# Patient Record
Sex: FEMALE | Race: WHITE | NOT HISPANIC OR LATINO | Employment: UNEMPLOYED | URBAN - METROPOLITAN AREA
[De-identification: names, ages, dates, MRNs, and addresses within clinical notes are randomized per-mention and may not be internally consistent; named-entity substitution may affect disease eponyms.]

---

## 2022-02-08 ENCOUNTER — HISTORICAL (OUTPATIENT)
Dept: ADMINISTRATIVE | Facility: HOSPITAL | Age: 32
End: 2022-02-08

## 2022-08-29 ENCOUNTER — HOSPITAL ENCOUNTER (EMERGENCY)
Facility: HOSPITAL | Age: 32
Discharge: HOME | End: 2022-08-29
Attending: EMERGENCY MEDICINE
Payer: MEDICAID

## 2022-08-29 VITALS
DIASTOLIC BLOOD PRESSURE: 74 MMHG | TEMPERATURE: 98 F | SYSTOLIC BLOOD PRESSURE: 110 MMHG | HEIGHT: 64 IN | OXYGEN SATURATION: 98 % | RESPIRATION RATE: 20 BRPM | HEART RATE: 97 BPM | WEIGHT: 179 LBS | BODY MASS INDEX: 30.56 KG/M2

## 2022-08-29 DIAGNOSIS — H66.001 ACUTE SUPPURATIVE OTITIS MEDIA OF RIGHT EAR WITHOUT SPONTANEOUS RUPTURE OF TYMPANIC MEMBRANE, RECURRENCE NOT SPECIFIED: Primary | ICD-10-CM

## 2022-08-29 DIAGNOSIS — Z20.822 CLOSE EXPOSURE TO COVID-19 VIRUS: ICD-10-CM

## 2022-08-29 DIAGNOSIS — R05.9 COUGH: ICD-10-CM

## 2022-08-29 LAB
FLUAV AG UPPER RESP QL IA.RAPID: NOT DETECTED
FLUBV AG UPPER RESP QL IA.RAPID: NOT DETECTED
SARS-COV-2 RNA RESP QL NAA+PROBE: NOT DETECTED
STREP A PCR (OHS): NOT DETECTED

## 2022-08-29 PROCEDURE — 99284 EMERGENCY DEPT VISIT MOD MDM: CPT | Mod: 25

## 2022-08-29 PROCEDURE — 87636 SARSCOV2 & INF A&B AMP PRB: CPT | Mod: 59 | Performed by: PHYSICIAN ASSISTANT

## 2022-08-29 PROCEDURE — 87651 STREP A DNA AMP PROBE: CPT | Performed by: PHYSICIAN ASSISTANT

## 2022-08-29 PROCEDURE — 87631 RESP VIRUS 3-5 TARGETS: CPT | Performed by: PHYSICIAN ASSISTANT

## 2022-08-29 RX ORDER — CETIRIZINE HYDROCHLORIDE 10 MG/1
10 TABLET ORAL DAILY
Qty: 30 TABLET | Refills: 0 | Status: SHIPPED | OUTPATIENT
Start: 2022-08-29 | End: 2022-09-12

## 2022-08-29 RX ORDER — AMOXICILLIN AND CLAVULANATE POTASSIUM 875; 125 MG/1; MG/1
1 TABLET, FILM COATED ORAL EVERY 12 HOURS
Qty: 20 TABLET | Refills: 0 | Status: SHIPPED | OUTPATIENT
Start: 2022-08-29 | End: 2022-09-08

## 2022-08-29 RX ORDER — BENZONATATE 100 MG/1
100 CAPSULE ORAL 3 TIMES DAILY PRN
Qty: 30 CAPSULE | Refills: 0 | Status: SHIPPED | OUTPATIENT
Start: 2022-08-29 | End: 2022-09-08

## 2022-08-29 RX ORDER — FLUTICASONE PROPIONATE 50 MCG
1 SPRAY, SUSPENSION (ML) NASAL 2 TIMES DAILY PRN
Qty: 15 G | Refills: 0 | Status: SHIPPED | OUTPATIENT
Start: 2022-08-29 | End: 2022-09-12

## 2022-08-29 RX ORDER — IBUPROFEN 600 MG/1
600 TABLET ORAL
Status: DISCONTINUED | OUTPATIENT
Start: 2022-08-29 | End: 2022-08-29 | Stop reason: HOSPADM

## 2022-08-29 RX ORDER — ONDANSETRON 4 MG/1
4 TABLET, FILM COATED ORAL EVERY 8 HOURS PRN
Qty: 15 TABLET | Refills: 0 | Status: SHIPPED | OUTPATIENT
Start: 2022-08-29 | End: 2022-09-03

## 2022-08-29 RX ORDER — ALBUTEROL SULFATE 90 UG/1
1-2 AEROSOL, METERED RESPIRATORY (INHALATION) EVERY 6 HOURS PRN
Qty: 8 G | Refills: 0 | Status: SHIPPED | OUTPATIENT
Start: 2022-08-29 | End: 2022-09-28

## 2022-08-29 NOTE — ED PROVIDER NOTES
Encounter Date: 8/29/2022       History     Chief Complaint   Patient presents with    Fever     Pt. C/o fever, body aches, throat pain, and cough since yesterday.      32 year old female presents to ED for fever, body aches, cough, congestion, nausea,  body aches,and sore throat since last night.  Patient currently in ED with  children with similar symptoms.    The history is provided by the patient.   Review of patient's allergies indicates:   Allergen Reactions    Fluoxetine Swelling    Iodine Swelling    Dicyclomine      Other reaction(s): facial swelling w/resp. distress  Other reaction(s): facial swelling w/resp. distress       No past medical history on file.  No past surgical history on file.  No family history on file.     Review of Systems   Constitutional:  Positive for fever.   HENT:  Positive for congestion and sore throat.    Eyes: Negative.    Respiratory:  Positive for cough. Negative for shortness of breath.    Cardiovascular:  Negative for chest pain.   Gastrointestinal:  Positive for nausea. Negative for abdominal pain, diarrhea and vomiting.   Endocrine: Negative.    Genitourinary: Negative.    Musculoskeletal:  Positive for arthralgias. Negative for back pain and myalgias.   Skin: Negative.    Allergic/Immunologic: Negative.    Neurological:  Negative for dizziness, numbness and headaches.   Hematological: Negative.    Psychiatric/Behavioral: Negative.     All other systems reviewed and are negative.    Physical Exam     Initial Vitals [08/29/22 1323]   BP Pulse Resp Temp SpO2   112/76 74 20 98.4 °F (36.9 °C) 98 %      MAP       --         Physical Exam    Constitutional: She appears well-developed and well-nourished.   HENT:   Head: Normocephalic and atraumatic.   Right Ear: A middle ear effusion is present.   Nose: Rhinorrhea present.   Mouth/Throat: Uvula is midline and oropharynx is clear and moist.   Eyes: EOM are normal. Pupils are equal, round, and reactive to light.   Neck: Neck supple.    Normal range of motion.  Cardiovascular:  Normal rate, regular rhythm and normal heart sounds.           Pulmonary/Chest: Breath sounds normal. No respiratory distress.    Mild expiratory wheezing to  posteriorupper lobes   Musculoskeletal:      Cervical back: Normal range of motion and neck supple.     Neurological: She is alert and oriented to person, place, and time.   Skin: Skin is warm and dry.   Psychiatric: She has a normal mood and affect.       ED Course   Procedures  Labs Reviewed   COVID/FLU A&B PCR - Normal   STREP GROUP A BY PCR - Normal          Imaging Results    None          Medications   ibuprofen tablet 600 mg (has no administration in time range)     Medical Decision Making:   ED Management:  Patient in no acute distress. Patient non-toxic in appearance, afebrile. Discussed with patient lab results.    One of patient's children tested positive for COVID  while in ED.  No vomiting in ED. Discussed with patient symptomatic care.  Discussed use of Tylenol/ Motrin as needed for fever/ pain.   Discussed use of oral antibiotic.  Discussed  with patient use of additional  prescription medications as needed.  Discussed with patient follow-up PCP.  ED precautions given for worsening symptoms.  Patient verbalized understanding.                    Clinical Impression:   Final diagnoses:  [H66.001] Acute suppurative otitis media of right ear without spontaneous rupture of tympanic membrane, recurrence not specified (Primary)  [R05.9] Cough  [Z20.822] Close exposure to COVID-19 virus      ED Disposition Condition    Discharge Stable          ED Prescriptions       Medication Sig Dispense Start Date End Date Auth. Provider    amoxicillin-clavulanate 875-125mg (AUGMENTIN) 875-125 mg per tablet Take 1 tablet by mouth every 12 (twelve) hours. for 10 days 20 tablet 8/29/2022 9/8/2022 Jasson Forbes PA-C    ondansetron (ZOFRAN) 4 MG tablet Take 1 tablet (4 mg total) by mouth every 8 (eight) hours as needed for  Nausea. 15 tablet 8/29/2022 9/3/2022 Jasson Forbes PA-C    fluticasone propionate (FLONASE) 50 mcg/actuation nasal spray 1 spray (50 mcg total) by Each Nostril route 2 (two) times daily as needed for Rhinitis. 15 g 8/29/2022 9/12/2022 Jasson Forbes PA-C    cetirizine (ZYRTEC) 10 MG tablet Take 1 tablet (10 mg total) by mouth once daily. for 14 days 30 tablet 8/29/2022 9/12/2022 Jasson Forbes PA-C    benzonatate (TESSALON) 100 MG capsule Take 1 capsule (100 mg total) by mouth 3 (three) times daily as needed for Cough. 30 capsule 8/29/2022 9/8/2022 Jasson Forbes PA-C    albuterol (PROVENTIL/VENTOLIN HFA) 90 mcg/actuation inhaler Inhale 1-2 puffs into the lungs every 6 (six) hours as needed for Wheezing (cough). Rescue 8 g 8/29/2022 9/28/2022 Jasson Forbes PA-C          Follow-up Information       Follow up With Specialties Details Why Contact Info    RUDI Cleary Nurse Practitioner  As needed, If symptoms worsen [address] [place] [id number] [phone]               Jasson Forbes PA-C  08/29/22 6990

## 2023-05-21 ENCOUNTER — HOSPITAL ENCOUNTER (EMERGENCY)
Facility: HOSPITAL | Age: 33
Discharge: HOME | End: 2023-05-21
Attending: EMERGENCY MEDICINE
Payer: MEDICAID

## 2023-05-21 VITALS
OXYGEN SATURATION: 99 % | SYSTOLIC BLOOD PRESSURE: 131 MMHG | RESPIRATION RATE: 17 BRPM | DIASTOLIC BLOOD PRESSURE: 85 MMHG | WEIGHT: 195 LBS | BODY MASS INDEX: 33.29 KG/M2 | TEMPERATURE: 99 F | HEART RATE: 79 BPM | HEIGHT: 64 IN

## 2023-05-21 DIAGNOSIS — K08.89 PAIN, DENTAL: Primary | ICD-10-CM

## 2023-05-21 LAB
ALBUMIN SERPL-MCNC: 4.6 G/DL (ref 3.5–5)
ALBUMIN/GLOB SERPL: 1.4 RATIO (ref 1.1–2)
ALP SERPL-CCNC: 65 UNIT/L (ref 40–150)
ALT SERPL-CCNC: 20 UNIT/L (ref 0–55)
AST SERPL-CCNC: 17 UNIT/L (ref 5–34)
BASOPHILS # BLD AUTO: 0.07 X10(3)/MCL
BASOPHILS NFR BLD AUTO: 0.6 %
BILIRUBIN DIRECT+TOT PNL SERPL-MCNC: 0.7 MG/DL
BUN SERPL-MCNC: 7.7 MG/DL (ref 7–18.7)
CALCIUM SERPL-MCNC: 9.9 MG/DL (ref 8.4–10.2)
CHLORIDE SERPL-SCNC: 109 MMOL/L (ref 98–107)
CO2 SERPL-SCNC: 19 MMOL/L (ref 22–29)
CREAT SERPL-MCNC: 0.78 MG/DL (ref 0.55–1.02)
EOSINOPHIL # BLD AUTO: 0.88 X10(3)/MCL (ref 0–0.9)
EOSINOPHIL NFR BLD AUTO: 7.8 %
ERYTHROCYTE [DISTWIDTH] IN BLOOD BY AUTOMATED COUNT: 12.8 % (ref 11.5–17)
GFR SERPLBLD CREATININE-BSD FMLA CKD-EPI: >60 MLS/MIN/1.73/M2
GLOBULIN SER-MCNC: 3.2 GM/DL (ref 2.4–3.5)
GLUCOSE SERPL-MCNC: 105 MG/DL (ref 74–100)
HCT VFR BLD AUTO: 45.8 % (ref 37–47)
HGB BLD-MCNC: 15.2 G/DL (ref 12–16)
IMM GRANULOCYTES # BLD AUTO: 0.04 X10(3)/MCL (ref 0–0.04)
IMM GRANULOCYTES NFR BLD AUTO: 0.4 %
LYMPHOCYTES # BLD AUTO: 3.96 X10(3)/MCL (ref 0.6–4.6)
LYMPHOCYTES NFR BLD AUTO: 35.2 %
MCH RBC QN AUTO: 31.3 PG (ref 27–31)
MCHC RBC AUTO-ENTMCNC: 33.2 G/DL (ref 33–36)
MCV RBC AUTO: 94.4 FL (ref 80–94)
MONOCYTES # BLD AUTO: 0.82 X10(3)/MCL (ref 0.1–1.3)
MONOCYTES NFR BLD AUTO: 7.3 %
NEUTROPHILS # BLD AUTO: 5.49 X10(3)/MCL (ref 2.1–9.2)
NEUTROPHILS NFR BLD AUTO: 48.7 %
NRBC BLD AUTO-RTO: 0 %
PLATELET # BLD AUTO: 363 X10(3)/MCL (ref 130–400)
PMV BLD AUTO: 9.1 FL (ref 7.4–10.4)
POTASSIUM SERPL-SCNC: 3.9 MMOL/L (ref 3.5–5.1)
PROT SERPL-MCNC: 7.8 GM/DL (ref 6.4–8.3)
RBC # BLD AUTO: 4.85 X10(6)/MCL (ref 4.2–5.4)
SODIUM SERPL-SCNC: 138 MMOL/L (ref 136–145)
WBC # SPEC AUTO: 11.26 X10(3)/MCL (ref 4.5–11.5)

## 2023-05-21 PROCEDURE — 80053 COMPREHEN METABOLIC PANEL: CPT | Performed by: NURSE PRACTITIONER

## 2023-05-21 PROCEDURE — 85025 COMPLETE CBC W/AUTO DIFF WBC: CPT | Performed by: NURSE PRACTITIONER

## 2023-05-21 PROCEDURE — 99283 EMERGENCY DEPT VISIT LOW MDM: CPT

## 2023-05-21 RX ORDER — HYDROCODONE BITARTRATE AND ACETAMINOPHEN 5; 325 MG/1; MG/1
1 TABLET ORAL EVERY 8 HOURS PRN
Qty: 15 TABLET | Refills: 0 | Status: SHIPPED | OUTPATIENT
Start: 2023-05-21 | End: 2023-05-26

## 2023-05-21 NOTE — ED PROVIDER NOTES
Encounter Date: 5/21/2023       History     Chief Complaint   Patient presents with    Oral Swelling     Pt reports swelling from gum with purulent drainage on right lower gum. Pt was on amxocillin and clindamycin x 1 week. Pt reports she had some teeth pulled recently.     33-year-old female presents to ED with persistent right lower gum pain that has been persistent for the last 2 weeks status post dental extractions 2 months ago.  Patient states that she saw her dentist at the onset of this, and had imaging done, and noted no issue with the tooth.  States she was started on amoxicillin, which she finished.  States that she did not saw her PCP, and was put on clindamycin after the fact which she is still currently taking.  Notes she was about 4 more days left.  States that she feels as though she was still having drainage to the area and subjective fever.  Denies chest was come uvula swelling, difficulty swallowing, nausea, vomiting.  States she has appointment with her dentist on June 7th and that was earliest they could her.    The history is provided by the patient. No  was used.   Review of patient's allergies indicates:   Allergen Reactions    Bentyl [dicyclomine] Swelling     Other reaction(s): facial swelling w/resp. distress  Other reaction(s): facial swelling w/resp. distress      Fluoxetine Swelling    Iodine Swelling     No past medical history on file.  No past surgical history on file.  No family history on file.     Review of Systems   Constitutional:  Negative for fever.   HENT:  Positive for dental problem. Negative for drooling and sore throat.    Respiratory:  Negative for shortness of breath.    Cardiovascular:  Negative for chest pain.   Gastrointestinal:  Negative for nausea.   Genitourinary:  Negative for dysuria.   Musculoskeletal:  Negative for back pain.   Skin:  Negative for rash.   Neurological:  Negative for weakness.   Hematological:  Does not bruise/bleed easily.    All other systems reviewed and are negative.    Physical Exam     Initial Vitals [05/21/23 1340]   BP Pulse Resp Temp SpO2   (!) 139/91 86 18 98.6 °F (37 °C) 96 %      MAP       --         Physical Exam    Nursing note and vitals reviewed.  Constitutional: She appears well-developed and well-nourished.   HENT:   Head: Normocephalic and atraumatic.   Mouth/Throat: Uvula is midline, oropharynx is clear and moist and mucous membranes are normal. No oral lesions. No trismus in the jaw. No dental abscesses or uvula swelling.   Edentulous to the right lower gumline, previously extracted teeth, no obvious fluid collection or abscess noted, gum tissue is mildly erythematous   Eyes: EOM are normal. Pupils are equal, round, and reactive to light.   Neck: Neck supple.   Cardiovascular:  Normal rate, regular rhythm and normal heart sounds.           Pulmonary/Chest: Breath sounds normal.   Musculoskeletal:         General: Normal range of motion.      Cervical back: Neck supple.     Neurological: She is alert and oriented to person, place, and time. She has normal strength. GCS score is 15. GCS eye subscore is 4. GCS verbal subscore is 5. GCS motor subscore is 6.   Skin: Skin is warm and dry.   Psychiatric: She has a normal mood and affect.       ED Course   Procedures  Labs Reviewed   COMPREHENSIVE METABOLIC PANEL - Abnormal; Notable for the following components:       Result Value    Chloride 109 (*)     Carbon Dioxide 19 (*)     Glucose Level 105 (*)     All other components within normal limits   CBC WITH DIFFERENTIAL - Abnormal; Notable for the following components:    MCV 94.4 (*)     MCH 31.3 (*)     All other components within normal limits   CBC W/ AUTO DIFFERENTIAL    Narrative:     The following orders were created for panel order CBC Auto Differential.  Procedure                               Abnormality         Status                     ---------                               -----------         ------                      CBC with Differential[[id number]]        Abnormal            Final result                 Please view results for these tests on the individual orders.          Imaging Results    None          Medications - No data to display  Medical Decision Making:   Initial Assessment:   33-year-old female presents to ED with persistent right lower gum pain that has been persistent for the last 2 weeks status post dental extractions 2 months ago.  Patient states that she saw her dentist at the onset of this, and had imaging done, and noted no issue with the tooth.  States she was started on amoxicillin, which she finished.  States that she did not saw her PCP, and was put on clindamycin after the fact which she is still currently taking.  Notes she was about 4 more days left.  States that she feels as though she was still having drainage to the area and subjective fever.  Denies chest was come uvula swelling, difficulty swallowing, nausea, vomiting.  States she has appointment with her dentist on June 7th and that was earliest they could her.    Differential Diagnosis:   Dental pain, dental caries, dental abscess  Clinical Tests:   Lab Tests: Reviewed and Ordered  ED Management:  No obvious signs of dental abscess or lesion.  Told to continue taking clindamycin the entire course.  Will dispo home with short course of Norco and told to follow up with her dentist tomorrow for further evaluation.                        Clinical Impression:   Final diagnoses:  [K08.89] Pain, dental (Primary)        ED Disposition Condition    Discharge Stable          ED Prescriptions       Medication Sig Dispense Start Date End Date Auth. Provider    HYDROcodone-acetaminophen (NORCO) 5-325 mg per tablet Take 1 tablet by mouth every 8 (eight) hours as needed for Pain. 15 tablet 5/21/2023 5/26/2023 Jasson Earl PA-C          Follow-up Information       Follow up With Specialties Details Why Contact Info    RUDI Cleary Nurse  Practitioner   [address] [facility]  [address]  [facility] [address]  [phone]      Dentist  Schedule an appointment as soon as possible for a visit                Jasson Earl PA-C  05/21/23 1648       Jasson Earl PA-C  05/21/23 1656

## 2023-05-21 NOTE — FIRST PROVIDER EVALUATION
Medical screening examination initiated.  I have conducted a focused provider triage encounter, findings are as follows:    Brief history of present illness:  34y/o F presents to the ED with right lower dental pain. States recently seen her dentist given clindamycin and amoxicillin with no relief. States fever home.     There were no vitals filed for this visit.    Pertinent physical exam:  AAA x 3    Brief workup plan:  Labs    Preliminary workup initiated; this workup will be continued and followed by the physician or advanced practice provider that is assigned to the patient when roomed.

## 2023-11-10 DIAGNOSIS — R51.9 FREQUENT HEADACHES: Primary | ICD-10-CM

## 2024-10-22 ENCOUNTER — OFFICE VISIT (OUTPATIENT)
Dept: HEMATOLOGY/ONCOLOGY | Facility: CLINIC | Age: 34
End: 2024-10-22
Payer: MEDICAID

## 2024-10-22 VITALS
BODY MASS INDEX: 33.84 KG/M2 | SYSTOLIC BLOOD PRESSURE: 104 MMHG | HEART RATE: 62 BPM | WEIGHT: 198.19 LBS | DIASTOLIC BLOOD PRESSURE: 72 MMHG | HEIGHT: 64 IN | TEMPERATURE: 98 F | RESPIRATION RATE: 16 BRPM | OXYGEN SATURATION: 100 %

## 2024-10-22 DIAGNOSIS — D50.9 IRON DEFICIENCY ANEMIA: Primary | ICD-10-CM

## 2024-10-22 DIAGNOSIS — D75.89 MACROCYTOSIS WITHOUT ANEMIA: Primary | ICD-10-CM

## 2024-10-22 DIAGNOSIS — D50.9 IRON DEFICIENCY ANEMIA, UNSPECIFIED IRON DEFICIENCY ANEMIA TYPE: Chronic | ICD-10-CM

## 2024-10-22 PROBLEM — F32.A ANXIETY AND DEPRESSION: Status: ACTIVE | Noted: 2024-02-29

## 2024-10-22 PROBLEM — J32.9 SINUSITIS: Status: ACTIVE | Noted: 2024-10-22

## 2024-10-22 PROBLEM — Z98.51 H/O TUBAL LIGATION: Status: ACTIVE | Noted: 2024-02-29

## 2024-10-22 PROBLEM — F41.9 ANXIETY AND DEPRESSION: Status: ACTIVE | Noted: 2024-02-29

## 2024-10-22 PROBLEM — Z86.69 H/O MIGRAINE: Status: ACTIVE | Noted: 2024-02-29

## 2024-10-22 NOTE — PROGRESS NOTES
Referring provider: Merline GRIJALVA     Reason for consultation: Macrocytosis     HPI:  34 Year old female with history of seasonal allergies dyslipidemia migraines here for evaluation of macrocytosis.  Patient has been dealing with numbness and tingling in her feet and back along with severe migraines and follows with Neurology.  She also sees a cardiologist for dyslipidemia and is not on any new medications.  Labs from May 21, 2023 show white blood cell count 63987 hemoglobin 15.2 MCV 94.4 platelets 363.  Review of labs dating as far back as 2000 19 of January show MCV at that time was 98.7.  Patient does not take any supplements at this time.  History reviewed. No pertinent past medical history.    History reviewed. No pertinent surgical history.    No family history on file.    Social History     Socioeconomic History    Marital status:        Current Outpatient Medications   Medication Sig Dispense Refill    albuterol (PROVENTIL/VENTOLIN HFA) 90 mcg/actuation inhaler Inhale 1-2 puffs into the lungs every 6 (six) hours as needed for Wheezing (cough). Rescue 8 g 0    cetirizine (ZYRTEC) 10 MG tablet Take 1 tablet (10 mg total) by mouth once daily. for 14 days 30 tablet 0     No current facility-administered medications for this visit.       Review of patient's allergies indicates:   Allergen Reactions    Bentyl [dicyclomine] Swelling     Other reaction(s): facial swelling w/resp. distress  Other reaction(s): facial swelling w/resp. distress      Fluoxetine Swelling    Iodine Swelling         Review of systems  CONSTITUTIONAL: no fevers, no chills, no weight loss, no fatigue, no weakness  HEMATOLOGIC: no abnormal bleeding, no abnormal bruising, no drenching night sweats  ONCOLOGIC: no new masses or lumps  HEENT: no vision loss, no tinnitus or hearing loss, no nose bleeding, no dysphagia, no odynophagia  CVS: no chest pain, no palpitations, no dyspnea on exertion  RESP: no shortness of breath, no  hemoptysis, no cough  GI: no nausea, no vomiting, no diarrhea, no constipation, no melena, no hematochezia, no hematemesis, no abdominal pain, no increase in abdominal girth  : no dysuria, no hematuria, no hesitancy, no scrotal swelling, no discharge  INTEGUMENT: no rashes, no abnormal bruising, no nail pitting, no hyperpigmentation  NEURO: no falls, no memory loss, no paresthesias or dysesthesias, no urofecal incontinence or retention, no loss of strength on any extremity  MSK: no back pain, no new joint pain, no joint swelling  PSYCH: no suicidal or homicidal ideation, no depression, no insomnia, no anhedonia  ENDOCRINE: no heat or cold intolerance, no polyuria, no polydipsia      Physical Exam:  Vitals:    10/22/24 1053   BP: 104/72   Pulse: 62   Resp: 16   Temp: 98.2 °F (36.8 °C)       ECOG PS 0  GA: AAOx3, NAD  HEENT: NCAT, PERRLA, EOMI, good dentition, moist oral mucous membranes  LYMPH: no cervical, axillary or supraclavicular adenopathy  CVS: s1s2 RRR, no M/R/G  RESP: CTA b/l, no crackles, no wheezes or rhonchi  ABD: soft, NT, ND, BS+, no hepatosplenomegaly  EXT: no deformities, no pedal edema  SKIN: no rashes, warm and dry  NEURO: normal mentation, strength 5/5 on all 4 extremities, no sensory deficits    LABS:  05/21/2023  White blood cell count 11,260 hemoglobin 15.2 MCV 94.4 platelets 363  RADIOLOGY:  None  PATHOLOGY  None  Assessment  1. Macrocytosis      Plan   I do not have any recent labs for patient to review but she does have persistent macrocytosis.  Dating back as far as 5 years ago.  I would like to check workup including B12 folic acid SPEP repeat CBC.  She does not appear to be on any medications that would cause macrocytosis and her age is not appropriate for bone marrow failure.  I have advised her to get routine blood work for CBC not just for cardiology she should have yearly lab work at minimum.  If she has any other cytopenias we can discuss rationale for bone marrow biopsy but not  indicated at this time follow up in 2 weeks with labs      A total of  45 minutes were spent in review of records, interpretation of test, coordination of care, discussion and counseling with the patient.        Portions of the record may have been created with voice recognition software. Occasional wrong-word or sound-a-like substitutions may have occurred due to the inherent limitations of voice recognition software. Read the chart carefully and recognize, using context, where substitutions have occurred.

## 2024-11-13 ENCOUNTER — OFFICE VISIT (OUTPATIENT)
Dept: HEMATOLOGY/ONCOLOGY | Facility: CLINIC | Age: 34
End: 2024-11-13
Payer: MEDICAID

## 2024-11-13 VITALS
TEMPERATURE: 98 F | HEIGHT: 64 IN | BODY MASS INDEX: 32.55 KG/M2 | RESPIRATION RATE: 16 BRPM | HEART RATE: 87 BPM | SYSTOLIC BLOOD PRESSURE: 117 MMHG | WEIGHT: 190.63 LBS | DIASTOLIC BLOOD PRESSURE: 83 MMHG | OXYGEN SATURATION: 100 %

## 2024-11-13 DIAGNOSIS — D75.89 MACROCYTOSIS WITHOUT ANEMIA: Primary | ICD-10-CM

## 2024-11-13 PROCEDURE — 3008F BODY MASS INDEX DOCD: CPT | Mod: CPTII,,, | Performed by: INTERNAL MEDICINE

## 2024-11-13 PROCEDURE — 3074F SYST BP LT 130 MM HG: CPT | Mod: CPTII,,, | Performed by: INTERNAL MEDICINE

## 2024-11-13 PROCEDURE — 3079F DIAST BP 80-89 MM HG: CPT | Mod: CPTII,,, | Performed by: INTERNAL MEDICINE

## 2024-11-13 PROCEDURE — 99214 OFFICE O/P EST MOD 30 MIN: CPT | Mod: ,,, | Performed by: INTERNAL MEDICINE

## 2024-11-13 PROCEDURE — 1159F MED LIST DOCD IN RCRD: CPT | Mod: CPTII,,, | Performed by: INTERNAL MEDICINE

## 2024-11-13 RX ORDER — ICOSAPENT ETHYL 1 G/1
2000 CAPSULE ORAL
COMMUNITY
Start: 2024-10-29

## 2024-11-13 RX ORDER — CYCLOBENZAPRINE HCL 10 MG
10 TABLET ORAL 3 TIMES DAILY PRN
COMMUNITY
Start: 2024-11-07 | End: 2025-03-07

## 2024-11-13 RX ORDER — FENOFIBRATE 160 MG/1
1 TABLET ORAL DAILY
COMMUNITY
Start: 2024-10-29

## 2024-11-13 NOTE — PROGRESS NOTES
Referring provider: Merline GRIJALVA      Reason for consultation: Macrocytosis      HPI:  34 Year old female with history of seasonal allergies dyslipidemia migraines here for evaluation of macrocytosis.  Patient has been dealing with numbness and tingling in her feet and back along with severe migraines and follows with Neurology.  She also sees a cardiologist for dyslipidemia and is not on any new medications.  Labs from May 21, 2023 show white blood cell count 35557 hemoglobin 15.2 MCV 94.4 platelets 363.  Review of labs dating as far back as 2000 19 of January show MCV at that time was 98.7.  Patient does not take any supplements at this time.  History reviewed. No pertinent past medical history.        Interval history:  Patient here for follow up today.  She had labs done on October 22nd that showed iron 57% sat 15 ferritin 63 folic acid 5.26 vitamin B12 740 white blood cell count 76800 hemoglobin 14.4 MCV 95 platelets 363 absolute neutrophil count 6160.  She is still having joint pains and lower back pain and starting physical therapy.  She is not able to have injections at this time due to her insurance and may need to change    History reviewed. No pertinent past medical history.    History reviewed. No pertinent surgical history.    No family history on file.    Social History     Socioeconomic History    Marital status:    Tobacco Use    Smoking status: Every Day     Current packs/day: 1.00     Types: Cigarettes    Smokeless tobacco: Never       Current Outpatient Medications   Medication Sig Dispense Refill    cyclobenzaprine (FLEXERIL) 10 MG tablet Take 10 mg by mouth 3 (three) times daily as needed.      fenofibrate 160 MG Tab Take 1 tablet by mouth once daily.      icosapent ethyL (VASCEPA) 1 gram Cap Take 2,000 mg by mouth.      albuterol (PROVENTIL/VENTOLIN HFA) 90 mcg/actuation inhaler Inhale 1-2 puffs into the lungs every 6 (six) hours as needed for Wheezing (cough). Rescue 8 g 0     cetirizine (ZYRTEC) 10 MG tablet Take 1 tablet (10 mg total) by mouth once daily. for 14 days 30 tablet 0     No current facility-administered medications for this visit.       Review of patient's allergies indicates:   Allergen Reactions    Bentyl [dicyclomine] Swelling     Other reaction(s): facial swelling w/resp. distress  Other reaction(s): facial swelling w/resp. distress      Fluoxetine Swelling    Iodine Swelling    Shellfish containing products Hives and Rash     Other reaction(s): swelling of hands and face         Review of systems  CONSTITUTIONAL: no fevers, no chills, no weight loss, no fatigue, no weakness  HEMATOLOGIC: no abnormal bleeding, no abnormal bruising, no drenching night sweats  ONCOLOGIC: no new masses or lumps  HEENT: no vision loss, no tinnitus or hearing loss, no nose bleeding, no dysphagia, no odynophagia  CVS: no chest pain, no palpitations, no dyspnea on exertion  RESP: no shortness of breath, no hemoptysis, no cough  GI: no nausea, no vomiting, no diarrhea, no constipation, no melena, no hematochezia, no hematemesis, no abdominal pain, no increase in abdominal girth  : no dysuria, no hematuria, no hesitancy, no scrotal swelling, no discharge  INTEGUMENT: no rashes, no abnormal bruising, no nail pitting, no hyperpigmentation  NEURO: no falls, no memory loss, no paresthesias or dysesthesias, no urofecal incontinence or retention, no loss of strength on any extremity  MSK: no back pain, no new joint pain, no joint swelling  PSYCH: no suicidal or homicidal ideation, no depression, no insomnia, no anhedonia  ENDOCRINE: no heat or cold intolerance, no polyuria, no polydipsia      Physical Exam:  Vitals:    11/13/24 1029   BP: 117/83   Pulse: 87   Resp: 16   Temp: 98.3 °F (36.8 °C)       ECOG PS 0  GA: AAOx3, NAD  HEENT: NCAT, PERRLA, EOMI, good dentition, moist oral mucous membranes  LYMPH: no cervical, axillary or supraclavicular adenopathy  CVS: s1s2 RRR, no M/R/G  RESP: CTA b/l, no  crackles, no wheezes or rhonchi  ABD: soft, NT, ND, BS+, no hepatosplenomegaly  EXT: no deformities, no pedal edema  SKIN: no rashes, warm and dry  NEURO: normal mentation, strength 5/5 on all 4 extremities, no sensory deficits    LABS:  10/22/24  SPEP negative iron 57% sat 15 ferritin 63 folic acid 5.26 vitamin B12 740 white blood cell count 94843 hemoglobin 14.4 MCV 95 platelets 363 absolute neutrophil count 6160.    RADIOLOGY:  None  PATHOLOGY  None  Assessment  1. Macrocytosis, with folic acid deficiency        Plan   Reviewed patient's labs in detail and show mild low ferritin.  She was started using an iron skillet and this should help.  I have also asked her to increase her iron intake with foods as she does not need to supplement she does not have anemia.  Her folic acid levels were moderately low and I have asked her to start a folic acid 1 mg per day supplement.  We will recheck her labs in 10 weeks.  The macrocytosis is not contributing to her joint pains and we will follow.  Thank you for allowing me to care for this patient      A total of  30 minutes were spent in review of records, interpretation of test, coordination of care, discussion and counseling with the patient.        Portions of the record may have been created with voice recognition software. Occasional wrong-word or sound-a-like substitutions may have occurred due to the inherent limitations of voice recognition software. Read the chart carefully and recognize, using context, where substitutions have occurred.

## 2025-01-29 ENCOUNTER — OFFICE VISIT (OUTPATIENT)
Dept: HEMATOLOGY/ONCOLOGY | Facility: CLINIC | Age: 35
End: 2025-01-29
Payer: MEDICAID

## 2025-01-29 VITALS
DIASTOLIC BLOOD PRESSURE: 73 MMHG | TEMPERATURE: 99 F | RESPIRATION RATE: 16 BRPM | HEIGHT: 64 IN | BODY MASS INDEX: 31.7 KG/M2 | SYSTOLIC BLOOD PRESSURE: 108 MMHG | HEART RATE: 99 BPM | OXYGEN SATURATION: 97 % | WEIGHT: 185.69 LBS

## 2025-01-29 DIAGNOSIS — D50.8 IRON DEFICIENCY ANEMIA SECONDARY TO INADEQUATE DIETARY IRON INTAKE: Primary | ICD-10-CM

## 2025-01-29 DIAGNOSIS — D75.89 MACROCYTOSIS WITHOUT ANEMIA: ICD-10-CM

## 2025-01-29 PROCEDURE — 3078F DIAST BP <80 MM HG: CPT | Mod: CPTII,,, | Performed by: INTERNAL MEDICINE

## 2025-01-29 PROCEDURE — 99214 OFFICE O/P EST MOD 30 MIN: CPT | Mod: ,,, | Performed by: INTERNAL MEDICINE

## 2025-01-29 PROCEDURE — 3074F SYST BP LT 130 MM HG: CPT | Mod: CPTII,,, | Performed by: INTERNAL MEDICINE

## 2025-01-29 PROCEDURE — 1159F MED LIST DOCD IN RCRD: CPT | Mod: CPTII,,, | Performed by: INTERNAL MEDICINE

## 2025-01-29 PROCEDURE — 3008F BODY MASS INDEX DOCD: CPT | Mod: CPTII,,, | Performed by: INTERNAL MEDICINE

## 2025-01-29 RX ORDER — IBUPROFEN 200 MG
1 TABLET ORAL
COMMUNITY
Start: 2025-01-23

## 2025-01-29 RX ORDER — AMITRIPTYLINE HYDROCHLORIDE 100 MG/1
100 TABLET ORAL NIGHTLY
COMMUNITY

## 2025-01-29 RX ORDER — GABAPENTIN 300 MG/1
300 CAPSULE ORAL
COMMUNITY
Start: 2025-01-09 | End: 2025-05-09

## 2025-01-29 NOTE — PROGRESS NOTES
Referring provider: Merline GRIJALVA      Reason for consultation: Macrocytosis      HPI:  34 Year old female with history of seasonal allergies dyslipidemia migraines here for evaluation of macrocytosis.  Patient has been dealing with numbness and tingling in her feet and back along with severe migraines and follows with Neurology.  She also sees a cardiologist for dyslipidemia and is not on any new medications.  Labs from May 21, 2023 show white blood cell count 32285 hemoglobin 15.2 MCV 94.4 platelets 363.  Review of labs dating as far back as 2000 19 of January show MCV at that time was 98.7.  Patient does not take any supplements at this time.  History reviewed. No pertinent past medical history.           Interval history:    01/29/2025: Patient continues on iron  supplementation and increased folic acid. She is having some abdominal pain and constipation.  Her labs show marked improvement in iron and now serum iron 75% sat 22 and ferritin of 78 with folic acid 7.17    Patient here for follow up today.  She had labs done on October 22nd that showed iron 57% sat 15 ferritin 63 folic acid 5.26 vitamin B12 740 white blood cell count 16167 hemoglobin 14.4 MCV 95 platelets 363 absolute neutrophil count 6160.  She is still having joint pains and lower back pain and starting physical therapy.  She is not able to have injections at this time due to her insurance and may need to change      No past medical history on file.    No past surgical history on file.    No family history on file.    Social History     Socioeconomic History    Marital status:    Tobacco Use    Smoking status: Every Day     Current packs/day: 1.00     Types: Cigarettes    Smokeless tobacco: Never       Current Outpatient Medications   Medication Sig Dispense Refill    albuterol (PROVENTIL/VENTOLIN HFA) 90 mcg/actuation inhaler Inhale 1-2 puffs into the lungs every 6 (six) hours as needed for Wheezing (cough). Rescue 8 g 0    cetirizine  (ZYRTEC) 10 MG tablet Take 1 tablet (10 mg total) by mouth once daily. for 14 days 30 tablet 0    cyclobenzaprine (FLEXERIL) 10 MG tablet Take 10 mg by mouth 3 (three) times daily as needed.      fenofibrate 160 MG Tab Take 1 tablet by mouth once daily.      icosapent ethyL (VASCEPA) 1 gram Cap Take 2,000 mg by mouth.       No current facility-administered medications for this visit.       Review of patient's allergies indicates:   Allergen Reactions    Bentyl [dicyclomine] Swelling     Other reaction(s): facial swelling w/resp. distress  Other reaction(s): facial swelling w/resp. distress      Fluoxetine Swelling    Iodine Swelling    Shellfish containing products Hives and Rash     Other reaction(s): swelling of hands and face         Review of systems  CONSTITUTIONAL: no fevers, no chills, no weight loss, no fatigue, no weakness  HEMATOLOGIC: no abnormal bleeding, no abnormal bruising, no drenching night sweats  ONCOLOGIC: no new masses or lumps  HEENT: no vision loss, no tinnitus or hearing loss, no nose bleeding, no dysphagia, no odynophagia  CVS: no chest pain, no palpitations, no dyspnea on exertion  RESP: no shortness of breath, no hemoptysis, no cough  GI: no nausea, no vomiting, no diarrhea, no constipation, no melena, no hematochezia, no hematemesis, no abdominal pain, no increase in abdominal girth  : no dysuria, no hematuria, no hesitancy, no scrotal swelling, no discharge  INTEGUMENT: no rashes, no abnormal bruising, no nail pitting, no hyperpigmentation  NEURO: no falls, no memory loss, no paresthesias or dysesthesias, no urofecal incontinence or retention, no loss of strength on any extremity  MSK: no back pain, no new joint pain, no joint swelling  PSYCH: no suicidal or homicidal ideation, no depression, no insomnia, no anhedonia  ENDOCRINE: no heat or cold intolerance, no polyuria, no polydipsia      Physical Exam:  Vitals:    01/29/25 1023   BP: 108/73   Pulse: 99   Resp: 16   Temp: 98.8 °F  (37.1 °C)       ECOG PS 0  GA: AAOx3, NAD  HEENT: NCAT, PERRLA, EOMI, good dentition, moist oral mucous membranes  LYMPH: no cervical, axillary or supraclavicular adenopathy  CVS: s1s2 RRR, no M/R/G  RESP: CTA b/l, no crackles, no wheezes or rhonchi  ABD: soft, NT, ND, BS+, no hepatosplenomegaly  EXT: no deformities, no pedal edema  SKIN: no rashes, warm and dry  NEURO: normal mentation, strength 5/5 on all 4 extremities, no sensory deficits    LABS:    01/27/2025   iron 75 % sat 22 ferritin 78 folic acid 7.17 B12 603 white blood cell count 66529 hemoglobin 14.2 MCV 93.1 platelets 302  10/22/24  SPEP negative iron 57% sat 15 ferritin 63 folic acid 5.26 vitamin B12 740 white blood cell count 10877 hemoglobin 14.4 MCV 95 platelets 363 absolute neutrophil count 6160.    RADIOLOGY:  None  PATHOLOGY  None  Assessment  1. Macrocytosis, with folic acid deficiency        Plan     01/29/2025: Patient has had marked improvement of iron levels with increased oral iron intake without infusion.  I recommend she continue the same plan of action and continue her iron pills but decrease to MWF.  Microcytosis has resolved.  We will follow her labs every 3 months moving forward      Reviewed patient's labs in detail and show mild low ferritin.  She was started using an iron skillet and this should help.  I have also asked her to increase her iron intake with foods as she does not need to supplement she does not have anemia.  Her folic acid levels were moderately low and I have asked her to start a folic acid 1 mg per day supplement.  We will recheck her labs in 10 weeks.  The macrocytosis is not contributing to her joint pains and we will follow.  Thank you for allowing me to care for this patient      A total of  30 minutes were spent in review of records, interpretation of test, coordination of care, discussion and counseling with the patient.        Portions of the record may have been created with voice recognition software.  Occasional wrong-word or sound-a-like substitutions may have occurred due to the inherent limitations of voice recognition software. Read the chart carefully and recognize, using context, where substitutions have occurred.

## 2025-03-19 NOTE — PROGRESS NOTES
"[facility] Neurology Initial Office Visit Note    Initial Visit Date: 3/21/2025  Current Visit Date:  03/19/2025    Chief Complaint:     No chief complaint on file.      History of Present Illness:      This is 35 y.o. female with history of anxiety, depression, macrocytosis who is referred for headache disorder.    Age of Onset : ***    Headache Description: ***    Frequency: *** headache days per month.     Provocation Factors: ***    Risk Factors  - Family history of headache disorder: {Multiple Choice Sets:42897::"No"}  - History of focal CNS lesions: {Multiple Choice Sets:02329::"No"}  - History of CNS infections: {Multiple Choice Sets:99616::"No"}  - History head trauma: {Multiple Choice Sets:51159::"No"}  - History of underlying mood disorder: Yes anxiety disorder  - History of sleep disorder: {Multiple Choice Sets:88022::"No"}  - Recreational drug use: {Multiple Choice Sets:80652::"No"}  - Tobacco use: {Multiple Choice Sets:41517::"No"}  - Alcohol use: {Multiple Choice Sets:07227::"No"}  - Weight fluctuation: {Multiple Choice Sets:67572::"No"}  - Isotretinoin or Tetracycline use:  {Multiple Choice Sets:11559::"No"}  - Family planning and contraceptive use: {Multiple Choice Sets:57132::"No"}    Medications:     Current Prophylactic  Amitriptyline 100 mg at bedtime  Gabapentin 300 mg three times a day    Current Abortive  Sumatriptan 100 mg twice a day as needed    Prior Prophylactic  ***    Prior Abortive  ***    Devices:     - VNS:  - TNS  - TMS:     Procedures:     - Botox:  - PSG block:   - Occipital nerve block:     Labs:     Results for orders placed or performed during the hospital encounter of 05/21/23   Comprehensive metabolic panel    Collection Time: 05/21/23  1:50 PM   Result Value Ref Range    Sodium 138 136 - 145 mmol/L    Potassium 3.9 3.5 - 5.1 mmol/L    Chloride 109 (H) 98 - 107 mmol/L    CO2 19 (L) 22 - 29 mmol/L    Glucose 105 (H) 74 - 100 mg/dL    Blood Urea Nitrogen 7.7 7.0 - 18.7 mg/dL    " Creatinine 0.78 0.55 - 1.02 mg/dL    Calcium 9.9 8.4 - 10.2 mg/dL    Protein Total 7.8 6.4 - 8.3 gm/dL    Albumin 4.6 3.5 - 5.0 g/dL    Globulin 3.2 2.4 - 3.5 gm/dL    Albumin/Globulin Ratio 1.4 1.1 - 2.0 ratio    Bilirubin Total 0.7 <=1.5 mg/dL    ALP 65 40 - 150 unit/L    ALT 20 0 - 55 unit/L    AST 17 5 - 34 unit/L    eGFR >60 mls/min/1.73/m2   CBC with Differential    Collection Time: 05/21/23  1:50 PM   Result Value Ref Range    WBC 11.26 4.50 - 11.50 x10(3)/mcL    RBC 4.85 4.20 - 5.40 x10(6)/mcL    Hgb 15.2 12.0 - 16.0 g/dL    Hct 45.8 37.0 - 47.0 %    MCV 94.4 (H) 80.0 - 94.0 fL    MCH 31.3 (H) 27.0 - 31.0 pg    MCHC 33.2 33.0 - 36.0 g/dL    RDW 12.8 11.5 - 17.0 %    Platelet 363 130 - 400 x10(3)/mcL    MPV 9.1 7.4 - 10.4 fL    Neut % 48.7 %    Lymph % 35.2 %    Mono % 7.3 %    Eos % 7.8 %    Basophil % 0.6 %    Lymph # 3.96 0.6 - 4.6 x10(3)/mcL    Neut # 5.49 2.1 - 9.2 x10(3)/mcL    Mono # 0.82 0.1 - 1.3 x10(3)/mcL    Eos # 0.88 0 - 0.9 x10(3)/mcL    Baso # 0.07 <=0.2 x10(3)/mcL    Imm Gran # 0.04 0 - 0.04 x10(3)/mcL    Imm Grans % 0.4 %    NRBC% 0.0 %       Studies:     - MRI Brain:   - MRA Head w/o Everton:   - MRV Head w/o Everton:   - NCHCT:  - Lumbar Puncture:    Review of Systems:     Review of Systems   All other systems reviewed and are negative.      Physical Exams:     There were no vitals filed for this visit.    Physical Exam  Vitals and nursing note reviewed.   Constitutional:       Appearance: Normal appearance.   HENT:      Head: Normocephalic and atraumatic.      Nose: Nose normal.      Mouth/Throat:      Mouth: Mucous membranes are moist.      Pharynx: Oropharynx is clear.   Eyes:      Conjunctiva/sclera: Conjunctivae normal.   Cardiovascular:      Rate and Rhythm: Normal rate and regular rhythm.      Pulses: Normal pulses.   Pulmonary:      Effort: Pulmonary effort is normal.      Breath sounds: Normal breath sounds.   Abdominal:      General: Abdomen is flat.   Musculoskeletal:         General:  Normal range of motion.      Cervical back: Normal range of motion.   Skin:     General: Skin is warm.   Neurological:      Mental Status: She is alert.         Comprehensive Neurological Exam:  Mental Status: Alert Oriented to Self, Date, and Place. Comprehension wnl. No dysarthria.   CN II - XII: AGUEDA, No APD, Fundus wnl OU, VFFC, No ptosis OU, EOMI without nystagmus LT/Temp symmetric in CN V1-3 distribution, Hearing grossly intact, Face Symmetric, Tongue and Uvula midline, Trapezius symmetric bilateral.   Motor: tone and bulk wnl throughout, no abnormal involuntary or voluntary movements, 5/5 to confrontation, Fine finger movements wnl b/l, No pronator drift.   Sensory: LT, Proprioception, Vibration, PP, Temp symmetric.  Reflexes: 2+ throughout  Cerebellar: FNF wnl b/l, RAHM wnl b/l  Romberg: Negative  Gait: normal.    Assessment:     This is 35 y.o. female with history of anxiety, depression, macrocytosis who is referred for headache disorder.    {There are no diagnoses linked to this encounter. (Refresh or delete this SmartLink)}    Plan:     [] amitriptyline 100 mg at bedtime  [] gabapentin 300 mg three times a day  [] sumatriptan 100 mg twice a day as needed     RTC ***    Headache education provided: good sleep hygiene and 7 hours of sleep per night, stress management, medication overuse education provided. Using more 3 OTC per week may worsen headaches, high intensity interval training has shown to reduce headache frequency. Low carb, high protein has shown to reduce headache frequency. Patient is instructed in keep headache diary.     I have explained the treatment plan, diagnosis, and prognosis to patient. All questions are answered to the best of my knowledge.     Visit today is associated with current or anticipated ongoing medical care related to this patient's single serious condition/complex condition as documented above.     Face to face time *** minutes, including documentation, chart review,  counseling, education, review of test results, relevant medical records, and coordination of care.       Sushma Mcfarlane MD   General Neurology  03/19/2025

## 2025-03-21 ENCOUNTER — OFFICE VISIT (OUTPATIENT)
Dept: NEUROLOGY | Facility: CLINIC | Age: 35
End: 2025-03-21
Payer: MEDICAID

## 2025-03-21 VITALS
BODY MASS INDEX: 31.76 KG/M2 | OXYGEN SATURATION: 98 % | WEIGHT: 186 LBS | DIASTOLIC BLOOD PRESSURE: 83 MMHG | HEART RATE: 86 BPM | TEMPERATURE: 98 F | HEIGHT: 64 IN | RESPIRATION RATE: 18 BRPM | SYSTOLIC BLOOD PRESSURE: 125 MMHG

## 2025-03-21 DIAGNOSIS — G44.40 MEDICATION OVERUSE HEADACHE: ICD-10-CM

## 2025-03-21 DIAGNOSIS — F32.2 CURRENT SEVERE EPISODE OF MAJOR DEPRESSIVE DISORDER WITHOUT PSYCHOTIC FEATURES WITHOUT PRIOR EPISODE: ICD-10-CM

## 2025-03-21 DIAGNOSIS — G43.E09 CHRONIC MIGRAINE WITH AURA WITHOUT STATUS MIGRAINOSUS, NOT INTRACTABLE: ICD-10-CM

## 2025-03-21 PROCEDURE — 99214 OFFICE O/P EST MOD 30 MIN: CPT | Mod: PBBFAC | Performed by: PSYCHIATRY & NEUROLOGY

## 2025-03-21 RX ORDER — DIVALPROEX SODIUM 250 MG/1
250 TABLET, FILM COATED, EXTENDED RELEASE ORAL EVERY 12 HOURS
Qty: 60 TABLET | Refills: 3 | Status: SHIPPED | OUTPATIENT
Start: 2025-03-21 | End: 2025-07-19

## 2025-03-21 RX ORDER — RIZATRIPTAN BENZOATE 10 MG/1
10 TABLET, ORALLY DISINTEGRATING ORAL 2 TIMES DAILY PRN
Qty: 9 TABLET | Refills: 4 | Status: SHIPPED | OUTPATIENT
Start: 2025-03-21 | End: 2025-07-19

## 2025-03-21 RX ORDER — DEXAMETHASONE 4 MG/1
4 TABLET ORAL EVERY 12 HOURS
Qty: 14 TABLET | Refills: 0 | Status: SHIPPED | OUTPATIENT
Start: 2025-03-21 | End: 2025-03-28

## 2025-03-21 RX ORDER — SUMATRIPTAN SUCCINATE 100 MG/1
100 TABLET ORAL EVERY 12 HOURS PRN
COMMUNITY
End: 2025-03-21

## 2025-03-21 RX ORDER — OMEGA-3-ACID ETHYL ESTERS 1 G/1
1 CAPSULE, LIQUID FILLED ORAL DAILY
COMMUNITY
Start: 2024-04-12

## 2025-03-21 NOTE — PROGRESS NOTES
[facility] Neurology Initial Office Visit Note    Initial Visit Date: 3/21/2025  Current Visit Date:  03/21/2025    Chief Complaint:     Chief Complaint   Patient presents with    Migraine     Started about 10 years ago, 3-5 a day, Sumatriptan not helping       History of Present Illness:      This is 35 y.o. female with history of anxiety, depression, macrocytosis who is referred for headache disorder.Patient presents here reporting that she has had headaches since the age of 12, and that they became significantly worse around 10 years ago. Patient has noticed worsening of headaches since she had child, as well as with the recent death of her mother. She describes the headaches as daily, 3-5x per day, sharp and stabbing in scalp and frontal area, and beginning in her posterior occipital region as squeezing sensation. Would last up to 1 week when she was off of medications. Has preceding blurry vision and photophobia and phonophobia that occur with the headache. Patient has been taking 4 tylenol 4 times per day for the past 10 years as well. Patient was started on sumatriptan and amitriptyline 1 year ago, and has had only mild improvement, and noticed no improvement after few months of therapy. Patient has significant stressors in her life and poor support. These include her child having significant heart disease as well as her partner. Patient must take care of family and do most of heavy labor at home due to these things. Only talks to one sibling when she can, and does so infrequently, and relies on Confucianist, but to limited extent. The patient denies fevers, chills, chest pain, palpitations, dyspnea, abdominal pain, or changes in urinary or bowel habits.       Age of Onset : 12    Headache Description: Stabbing in front of head and scalp, mostly L sided, and squeezing in posterior occipital area and posterior neck    Frequency: 30 headache days per month.     Provocation Factors: life stressors, bright lights, loud  sounds, certain smells.    Risk Factors  - Family history of headache disorder: No  - History of focal CNS lesions: No  - History of CNS infections: No  - History head trauma: Yes Had screw  penetrating scalp 2 cm when she was a child  - History of underlying mood disorder: Yes anxiety disorder  - History of sleep disorder: Yes insomnia, difficulty getting to sleep  - Recreational drug use: Yes alcohol use history  - Tobacco use: No  - Alcohol use: Yes has history of abuse, sober for 3 years  - Weight fluctuation: Unknown  - Isotretinoin or Tetracycline use:  No  - Family planning and contraceptive use: No    Medications:     Current Prophylactic  Amitriptyline 100 mg at bedtime  Gabapentin 300 mg three times a day    Current Abortive  Sumatriptan 100 mg twice a day as needed    Prior Prophylactic  none    Prior Abortive  none    Devices:     - VNS:  - TNS  - TMS:     Procedures:     - Botox:  - PSG block:   - Occipital nerve block:     Labs:     Results for orders placed or performed during the hospital encounter of 05/21/23   Comprehensive metabolic panel    Collection Time: 05/21/23  1:50 PM   Result Value Ref Range    Sodium 138 136 - 145 mmol/L    Potassium 3.9 3.5 - 5.1 mmol/L    Chloride 109 (H) 98 - 107 mmol/L    CO2 19 (L) 22 - 29 mmol/L    Glucose 105 (H) 74 - 100 mg/dL    Blood Urea Nitrogen 7.7 7.0 - 18.7 mg/dL    Creatinine 0.78 0.55 - 1.02 mg/dL    Calcium 9.9 8.4 - 10.2 mg/dL    Protein Total 7.8 6.4 - 8.3 gm/dL    Albumin 4.6 3.5 - 5.0 g/dL    Globulin 3.2 2.4 - 3.5 gm/dL    Albumin/Globulin Ratio 1.4 1.1 - 2.0 ratio    Bilirubin Total 0.7 <=1.5 mg/dL    ALP 65 40 - 150 unit/L    ALT 20 0 - 55 unit/L    AST 17 5 - 34 unit/L    eGFR >60 mls/min/1.73/m2   CBC with Differential    Collection Time: 05/21/23  1:50 PM   Result Value Ref Range    WBC 11.26 4.50 - 11.50 x10(3)/mcL    RBC 4.85 4.20 - 5.40 x10(6)/mcL    Hgb 15.2 12.0 - 16.0 g/dL    Hct 45.8 37.0 - 47.0 %    MCV 94.4 (H) 80.0 - 94.0 fL     MCH 31.3 (H) 27.0 - 31.0 pg    MCHC 33.2 33.0 - 36.0 g/dL    RDW 12.8 11.5 - 17.0 %    Platelet 363 130 - 400 x10(3)/mcL    MPV 9.1 7.4 - 10.4 fL    Neut % 48.7 %    Lymph % 35.2 %    Mono % 7.3 %    Eos % 7.8 %    Basophil % 0.6 %    Lymph # 3.96 0.6 - 4.6 x10(3)/mcL    Neut # 5.49 2.1 - 9.2 x10(3)/mcL    Mono # 0.82 0.1 - 1.3 x10(3)/mcL    Eos # 0.88 0 - 0.9 x10(3)/mcL    Baso # 0.07 <=0.2 x10(3)/mcL    Imm Gran # 0.04 0 - 0.04 x10(3)/mcL    Imm Grans % 0.4 %    NRBC% 0.0 %       Studies:     - MRI Brain:   - MRA Head w/o Everton:   - MRV Head w/o Everton:   - NCHCT:  - Lumbar Puncture:    Review of Systems:     Review of Systems   All other systems reviewed and are negative.      Physical Exams:     Vitals:    03/21/25 0750   BP: 125/83   Pulse: 86   Resp: 18   Temp: 97.7 °F (36.5 °C)       Physical Exam  Vitals and nursing note reviewed.   Constitutional:       Appearance: Normal appearance.   HENT:      Head: Normocephalic and atraumatic.      Nose: Nose normal.      Mouth/Throat:      Mouth: Mucous membranes are moist.      Pharynx: Oropharynx is clear.   Eyes:      Conjunctiva/sclera: Conjunctivae normal.   Cardiovascular:      Rate and Rhythm: Normal rate and regular rhythm.      Pulses: Normal pulses.   Pulmonary:      Effort: Pulmonary effort is normal.      Breath sounds: Normal breath sounds.   Abdominal:      General: Abdomen is flat.   Musculoskeletal:         General: Normal range of motion.      Cervical back: Normal range of motion.   Skin:     General: Skin is warm.   Neurological:      Mental Status: She is alert.       Comprehensive Neurological Exam:  Mental Status: Alert Oriented to Self, Date, and Place. Comprehension wnl. No dysarthria.   CN II - XII: AGUEDA, No APD, Fundus wnl OU, VFFC, No ptosis OU, EOMI without nystagmus LT/Temp symmetric in CN V1-3 distribution, Hearing grossly intact, Face Symmetric, Tongue and Uvula midline, Trapezius symmetric bilateral.   Motor: tone and bulk wnl throughout, no  abnormal involuntary or voluntary movements, 5/5 to confrontation, Fine finger movements wnl b/l, No pronator drift; 4/5 strength to L hip flexion  Sensory: LT, Proprioception, Vibration, PP, Temp symmetric.  Reflexes: 2+ throughout  Cerebellar: FNF wnl b/l, RAHM wnl b/l  Romberg: Negative  Gait: normal.    Assessment:     This is 35 y.o. female with history of anxiety, depression, macrocytosis who is referred for headache disorder., with headaches lasting 30 minutes to 1 hour on medications, 3-5x per day, and worsening for the past 10 years; has aura of blurry vision, and pain lasts up to multiple days without medications; has had extensive tylenol use of 4 pills, 4x per day; patient with migraine symptoms worsening likely 2/2 medication overuse, life stressors    {There are no diagnoses linked to this encounter. (Refresh or delete this SmartLink)}    Plan:     [[] start Decadron 4 mg twice a day for 7 days   [] start Rizatriptan 10 mg twice a day as needed   [] start Depakote 250 mg twice a day   [] stop Tylenol  [] stop Sumatriptan   [] advised patient to find good support system    RTC 3 months    Headache education provided: good sleep hygiene and 7 hours of sleep per night, stress management, medication overuse education provided. Using more 3 OTC per week may worsen headaches, high intensity interval training has shown to reduce headache frequency. Low carb, high protein has shown to reduce headache frequency. Patient is instructed in keep headache diary.     I have explained the treatment plan, diagnosis, and prognosis to patient. All questions are answered to the best of my knowledge.     Visit today is associated with current or anticipated ongoing medical care related to this patient's single serious condition/complex condition as documented above.     Face to face time 40 minutes, including documentation, chart review, counseling, education, review of test results, relevant medical records, and  coordination of care.       Patricia Campbell MD   General Neurology PGY-1  03/21/2025

## 2025-03-21 NOTE — PROGRESS NOTES
[facility] Neurology Initial Office Visit Note    Initial Visit Date: 3/21/2025  Current Visit Date:  03/21/2025    Chief Complaint:     Chief Complaint   Patient presents with    Migraine     Started about 10 years ago, 3-5 a day, Sumatriptan not helping       History of Present Illness:      This is 35 y.o. female with history of anxiety, depression, macrocytosis who is referred for headache disorder.Patient presents here reporting that she has had headaches since the age of 12, and that they became significantly worse around 10 years ago. Patient has noticed worsening of headaches since she had child, as well as with the recent death of her mother. She describes the headaches as daily, 3-5x per day, sharp and stabbing in scalp and frontal area, and beginning in her posterior occipital region as squeezing sensation. Would last up to 1 week when she was off of medications. Has preceding blurry vision and photophobia and phonophobia that occur with the headache. Patient has been taking 4 tylenol 4 times per day for the past 10 years as well. Patient was started on sumatriptan and amitriptyline 1 year ago, and has had only mild improvement, and noticed no improvement after few months of therapy. Patient has significant stressors in her life and poor support. These include her child having significant heart disease as well as her partner. Patient must take care of family and do most of heavy labor at home due to these things. Only talks to one sibling when she can, and does so infrequently, and relies on Hindu, but to limited extent. The patient denies fevers, chills, chest pain, palpitations, dyspnea, abdominal pain, or changes in urinary or bowel habits.       Age of Onset : 12    Headache Description: Stabbing in front of head and scalp, mostly L sided, and squeezing in posterior occipital area and posterior neck    Frequency: 30 headache days per month.     Provocation Factors: life stressors, bright lights, loud  sounds, certain smells.    Risk Factors  - Family history of headache disorder: No  - History of focal CNS lesions: No  - History of CNS infections: No  - History head trauma: Yes Had screw  penetrating scalp 2 cm when she was a child  - History of underlying mood disorder: Yes anxiety disorder  - History of sleep disorder: Yes insomnia, difficulty getting to sleep  - Recreational drug use: Yes alcohol use history  - Tobacco use: No  - Alcohol use: Yes has history of abuse, sober for 3 years  - Weight fluctuation: Unknown  - Isotretinoin or Tetracycline use:  No  - Family planning and contraceptive use: No    Medications:     Current Prophylactic  Amitriptyline 100 mg at bedtime  Gabapentin 300 mg three times a day    Current Abortive  Sumatriptan 100 mg twice a day as needed    Prior Prophylactic  none    Prior Abortive  none    Devices:     - VNS:  - TNS  - TMS:     Procedures:     - Botox:  - PSG block:   - Occipital nerve block:     Labs:     Results for orders placed or performed during the hospital encounter of 05/21/23   Comprehensive metabolic panel    Collection Time: 05/21/23  1:50 PM   Result Value Ref Range    Sodium 138 136 - 145 mmol/L    Potassium 3.9 3.5 - 5.1 mmol/L    Chloride 109 (H) 98 - 107 mmol/L    CO2 19 (L) 22 - 29 mmol/L    Glucose 105 (H) 74 - 100 mg/dL    Blood Urea Nitrogen 7.7 7.0 - 18.7 mg/dL    Creatinine 0.78 0.55 - 1.02 mg/dL    Calcium 9.9 8.4 - 10.2 mg/dL    Protein Total 7.8 6.4 - 8.3 gm/dL    Albumin 4.6 3.5 - 5.0 g/dL    Globulin 3.2 2.4 - 3.5 gm/dL    Albumin/Globulin Ratio 1.4 1.1 - 2.0 ratio    Bilirubin Total 0.7 <=1.5 mg/dL    ALP 65 40 - 150 unit/L    ALT 20 0 - 55 unit/L    AST 17 5 - 34 unit/L    eGFR >60 mls/min/1.73/m2   CBC with Differential    Collection Time: 05/21/23  1:50 PM   Result Value Ref Range    WBC 11.26 4.50 - 11.50 x10(3)/mcL    RBC 4.85 4.20 - 5.40 x10(6)/mcL    Hgb 15.2 12.0 - 16.0 g/dL    Hct 45.8 37.0 - 47.0 %    MCV 94.4 (H) 80.0 - 94.0 fL     MCH 31.3 (H) 27.0 - 31.0 pg    MCHC 33.2 33.0 - 36.0 g/dL    RDW 12.8 11.5 - 17.0 %    Platelet 363 130 - 400 x10(3)/mcL    MPV 9.1 7.4 - 10.4 fL    Neut % 48.7 %    Lymph % 35.2 %    Mono % 7.3 %    Eos % 7.8 %    Basophil % 0.6 %    Lymph # 3.96 0.6 - 4.6 x10(3)/mcL    Neut # 5.49 2.1 - 9.2 x10(3)/mcL    Mono # 0.82 0.1 - 1.3 x10(3)/mcL    Eos # 0.88 0 - 0.9 x10(3)/mcL    Baso # 0.07 <=0.2 x10(3)/mcL    Imm Gran # 0.04 0 - 0.04 x10(3)/mcL    Imm Grans % 0.4 %    NRBC% 0.0 %       Studies:     - MRI Brain:   - MRA Head w/o Everton:   - MRV Head w/o [place]:   - NCHCT:  - Lumbar Puncture:    Review of Systems:     Review of Systems   All other systems reviewed and are negative.      Physical Exams:     Vitals:    03/21/25 0750   BP: 125/83   Pulse: 86   Resp: 18   Temp: 97.7 °F (36.5 °C)       Physical Exam  Vitals and nursing note reviewed.   Constitutional:       Appearance: Normal appearance.   HENT:      Head: Normocephalic and atraumatic.      Nose: Nose normal.      Mouth/Throat:      Mouth: Mucous membranes are moist.      Pharynx: Oropharynx is clear.   Eyes:      Conjunctiva/sclera: Conjunctivae normal.   Cardiovascular:      Rate and Rhythm: Normal rate and regular rhythm.      Pulses: Normal pulses.   Pulmonary:      Effort: Pulmonary effort is normal.      Breath sounds: Normal breath sounds.   Abdominal:      General: Abdomen is flat.   Musculoskeletal:         General: Normal range of motion.      Cervical back: Normal range of motion.   Skin:     General: Skin is warm.   Neurological:      Mental Status: She is alert.         Comprehensive Neurological Exam:  Mental Status: Alert Oriented to Self, Date, and Place. Comprehension wnl. No dysarthria.   CN II - XII: AGUEDA, No APD, Fundus wnl OU, VFFC, No ptosis OU, EOMI without nystagmus LT/Temp symmetric in CN V1-3 distribution, Hearing grossly intact, Face Symmetric, Tongue and Uvula midline, Trapezius symmetric bilateral.   Motor: tone and bulk wnl throughout,  no abnormal involuntary or voluntary movements, 5/5 to confrontation, Fine finger movements wnl b/l, No pronator drift; 4/5 strength to L hip flexion  Sensory: LT, Proprioception, Vibration, PP, Temp symmetric.  Reflexes: 2+ throughout  Cerebellar: FNF wnl b/l, RAHM wnl b/l  Romberg: Negative  Gait: normal.    Assessment:     This is 35 y.o. female with history of anxiety, depression, macrocytosis who is referred for headache disorder., with headaches lasting 30 minutes to 1 hour on medications, 3-5x per day, and worsening for the past 10 years; has aura of blurry vision, and pain lasts up to multiple days without medications; has had extensive tylenol use of 4 pills, 4x per day; patient with migraine symptoms worsening likely 2/2 medication overuse, life stressors        Plan:     [[] start Decadron 4 mg twice a day for 7 days   [] start Rizatriptan 10 mg twice a day as needed   [] start Depakote 250 mg twice a day   [] stop Tylenol  [] stop Sumatriptan   [] advised patient to find good support system    RTC 3 months    Headache education provided: good sleep hygiene and 7 hours of sleep per night, stress management, medication overuse education provided. Using more 3 OTC per week may worsen headaches, high intensity interval training has shown to reduce headache frequency. Low carb, high protein has shown to reduce headache frequency. Patient is instructed in keep headache diary.     I have explained the treatment plan, diagnosis, and prognosis to patient. All questions are answered to the best of my knowledge.     Visit today is associated with current or anticipated ongoing medical care related to this patient's single serious condition/complex condition as documented above.     Face to face time 40 minutes, including documentation, chart review, counseling, education, review of test results, relevant medical records, and coordination of care.       Patricia Campbell MD   General Neurology PGY-1  03/21/2025

## 2025-03-25 ENCOUNTER — LAB VISIT (OUTPATIENT)
Dept: LAB | Facility: HOSPITAL | Age: 35
End: 2025-03-25
Attending: INTERNAL MEDICINE
Payer: MEDICAID

## 2025-03-25 DIAGNOSIS — Z82.49 FAMILY HISTORY OF ISCHEMIC HEART DISEASE: Primary | ICD-10-CM

## 2025-03-25 DIAGNOSIS — E78.2 MIXED HYPERLIPIDEMIA: ICD-10-CM

## 2025-03-25 LAB
CHOLEST SERPL-MCNC: 224 MG/DL
CHOLEST/HDLC SERPL: 5 {RATIO} (ref 0–5)
CRP SERPL HS-MCNC: 2.69 MG/L
HDLC SERPL-MCNC: 42 MG/DL (ref 35–60)
LDLC SERPL CALC-MCNC: 155 MG/DL (ref 50–140)
TRIGL SERPL-MCNC: 134 MG/DL (ref 37–140)
VLDLC SERPL CALC-MCNC: 27 MG/DL

## 2025-03-25 PROCEDURE — 86141 C-REACTIVE PROTEIN HS: CPT

## 2025-03-25 PROCEDURE — 36415 COLL VENOUS BLD VENIPUNCTURE: CPT

## 2025-03-25 PROCEDURE — 82172 ASSAY OF APOLIPOPROTEIN: CPT

## 2025-03-25 PROCEDURE — 80061 LIPID PANEL: CPT

## 2025-03-27 LAB
APO A-I SERPL-MCNC: 135 MG/DL
APO B SERPL-MCNC: 127 MG/DL
APO B/APO A-I SERPL: 0.9 {RATIO}

## 2025-04-13 ENCOUNTER — HOSPITAL ENCOUNTER (OUTPATIENT)
Facility: HOSPITAL | Age: 35
Discharge: HOME | End: 2025-04-15
Attending: STUDENT IN AN ORGANIZED HEALTH CARE EDUCATION/TRAINING PROGRAM | Admitting: SURGERY
Payer: MEDICAID

## 2025-04-13 ENCOUNTER — ANESTHESIA EVENT (OUTPATIENT)
Dept: SURGERY | Facility: HOSPITAL | Age: 35
End: 2025-04-13
Payer: MEDICAID

## 2025-04-13 ENCOUNTER — ANESTHESIA (OUTPATIENT)
Dept: SURGERY | Facility: HOSPITAL | Age: 35
End: 2025-04-13
Payer: MEDICAID

## 2025-04-13 DIAGNOSIS — R10.31 ACUTE RIGHT LOWER QUADRANT PAIN: ICD-10-CM

## 2025-04-13 DIAGNOSIS — R10.9 ACUTE RIGHT FLANK PAIN: ICD-10-CM

## 2025-04-13 DIAGNOSIS — K35.30 ACUTE APPENDICITIS WITH LOCALIZED PERITONITIS, WITHOUT PERFORATION, ABSCESS, OR GANGRENE: Primary | ICD-10-CM

## 2025-04-13 DIAGNOSIS — G44.40 MEDICATION OVERUSE HEADACHE: ICD-10-CM

## 2025-04-13 DIAGNOSIS — F32.2 CURRENT SEVERE EPISODE OF MAJOR DEPRESSIVE DISORDER WITHOUT PSYCHOTIC FEATURES WITHOUT PRIOR EPISODE: ICD-10-CM

## 2025-04-13 DIAGNOSIS — D72.829 LEUKOCYTOSIS, UNSPECIFIED TYPE: ICD-10-CM

## 2025-04-13 DIAGNOSIS — R65.10 SIRS (SYSTEMIC INFLAMMATORY RESPONSE SYNDROME): ICD-10-CM

## 2025-04-13 LAB
ALBUMIN SERPL-MCNC: 3.8 G/DL (ref 3.5–5)
ALBUMIN/GLOB SERPL: 1.2 RATIO (ref 1.1–2)
ALP SERPL-CCNC: 56 UNIT/L (ref 40–150)
ALT SERPL-CCNC: 21 UNIT/L (ref 0–55)
ANION GAP SERPL CALC-SCNC: 8 MEQ/L
AST SERPL-CCNC: 14 UNIT/L (ref 11–45)
B-HCG UR QL: NEGATIVE
BACTERIA #/AREA URNS AUTO: ABNORMAL /HPF
BASOPHILS # BLD AUTO: 0.12 X10(3)/MCL
BASOPHILS NFR BLD AUTO: 0.8 %
BILIRUB SERPL-MCNC: 0.1 MG/DL
BILIRUB UR QL STRIP.AUTO: NEGATIVE
BUN SERPL-MCNC: 14.4 MG/DL (ref 7–18.7)
CALCIUM SERPL-MCNC: 9.6 MG/DL (ref 8.4–10.2)
CHLORIDE SERPL-SCNC: 108 MMOL/L (ref 98–107)
CLARITY UR: ABNORMAL
CO2 SERPL-SCNC: 23 MMOL/L (ref 22–29)
COLOR UR AUTO: ABNORMAL
CREAT SERPL-MCNC: 0.78 MG/DL (ref 0.55–1.02)
CREAT/UREA NIT SERPL: 18
CTP QC/QA: YES
EOSINOPHIL # BLD AUTO: 1.52 X10(3)/MCL (ref 0–0.9)
EOSINOPHIL NFR BLD AUTO: 9.5 %
ERYTHROCYTE [DISTWIDTH] IN BLOOD BY AUTOMATED COUNT: 12.9 % (ref 11.5–17)
GFR SERPLBLD CREATININE-BSD FMLA CKD-EPI: >60 ML/MIN/1.73/M2
GLOBULIN SER-MCNC: 3.2 GM/DL (ref 2.4–3.5)
GLUCOSE SERPL-MCNC: 116 MG/DL (ref 74–100)
GLUCOSE UR QL STRIP: NORMAL
HCT VFR BLD AUTO: 41.8 % (ref 37–47)
HGB BLD-MCNC: 14 G/DL (ref 12–16)
HGB UR QL STRIP: ABNORMAL
IMM GRANULOCYTES # BLD AUTO: 0.13 X10(3)/MCL (ref 0–0.04)
IMM GRANULOCYTES NFR BLD AUTO: 0.8 %
KETONES UR QL STRIP: NEGATIVE
LACTATE SERPL-SCNC: 1.5 MMOL/L (ref 0.5–2.2)
LEUKOCYTE ESTERASE UR QL STRIP: NEGATIVE
LIPASE SERPL-CCNC: 32 U/L
LYMPHOCYTES # BLD AUTO: 4.22 X10(3)/MCL (ref 0.6–4.6)
LYMPHOCYTES NFR BLD AUTO: 26.5 %
MCH RBC QN AUTO: 31.8 PG (ref 27–31)
MCHC RBC AUTO-ENTMCNC: 33.5 G/DL (ref 33–36)
MCV RBC AUTO: 95 FL (ref 80–94)
MONOCYTES # BLD AUTO: 1.18 X10(3)/MCL (ref 0.1–1.3)
MONOCYTES NFR BLD AUTO: 7.4 %
NEUTROPHILS # BLD AUTO: 8.76 X10(3)/MCL (ref 2.1–9.2)
NEUTROPHILS NFR BLD AUTO: 55 %
NITRITE UR QL STRIP: NEGATIVE
NRBC BLD AUTO-RTO: 0 %
PH UR STRIP: 6.5 [PH]
PLATELET # BLD AUTO: 396 X10(3)/MCL (ref 130–400)
PMV BLD AUTO: 10.3 FL (ref 7.4–10.4)
POTASSIUM SERPL-SCNC: 4.5 MMOL/L (ref 3.5–5.1)
PROT SERPL-MCNC: 7 GM/DL (ref 6.4–8.3)
PROT UR QL STRIP: NEGATIVE
RBC # BLD AUTO: 4.4 X10(6)/MCL (ref 4.2–5.4)
RBC #/AREA URNS AUTO: ABNORMAL /HPF
SODIUM SERPL-SCNC: 139 MMOL/L (ref 136–145)
SP GR UR STRIP.AUTO: 1.02 (ref 1–1.03)
SQUAMOUS #/AREA URNS LPF: ABNORMAL /HPF
UROBILINOGEN UR STRIP-ACNC: NORMAL
WBC # BLD AUTO: 15.93 X10(3)/MCL (ref 4.5–11.5)
WBC #/AREA URNS AUTO: ABNORMAL /HPF

## 2025-04-13 PROCEDURE — 36000709 HC OR TIME LEV III EA ADD 15 MIN: Performed by: STUDENT IN AN ORGANIZED HEALTH CARE EDUCATION/TRAINING PROGRAM

## 2025-04-13 PROCEDURE — 25000003 PHARM REV CODE 250: Performed by: STUDENT IN AN ORGANIZED HEALTH CARE EDUCATION/TRAINING PROGRAM

## 2025-04-13 PROCEDURE — 37000009 HC ANESTHESIA EA ADD 15 MINS: Performed by: STUDENT IN AN ORGANIZED HEALTH CARE EDUCATION/TRAINING PROGRAM

## 2025-04-13 PROCEDURE — 88304 TISSUE EXAM BY PATHOLOGIST: CPT | Performed by: STUDENT IN AN ORGANIZED HEALTH CARE EDUCATION/TRAINING PROGRAM

## 2025-04-13 PROCEDURE — 80053 COMPREHEN METABOLIC PANEL: CPT | Performed by: EMERGENCY MEDICINE

## 2025-04-13 PROCEDURE — 83605 ASSAY OF LACTIC ACID: CPT | Performed by: STUDENT IN AN ORGANIZED HEALTH CARE EDUCATION/TRAINING PROGRAM

## 2025-04-13 PROCEDURE — 63600175 PHARM REV CODE 636 W HCPCS

## 2025-04-13 PROCEDURE — 25000003 PHARM REV CODE 250: Performed by: NURSE ANESTHETIST, CERTIFIED REGISTERED

## 2025-04-13 PROCEDURE — 27201423 OPTIME MED/SURG SUP & DEVICES STERILE SUPPLY: Performed by: STUDENT IN AN ORGANIZED HEALTH CARE EDUCATION/TRAINING PROGRAM

## 2025-04-13 PROCEDURE — 37000008 HC ANESTHESIA 1ST 15 MINUTES: Performed by: STUDENT IN AN ORGANIZED HEALTH CARE EDUCATION/TRAINING PROGRAM

## 2025-04-13 PROCEDURE — 11000001 HC ACUTE MED/SURG PRIVATE ROOM

## 2025-04-13 PROCEDURE — 96375 TX/PRO/DX INJ NEW DRUG ADDON: CPT

## 2025-04-13 PROCEDURE — 63600175 PHARM REV CODE 636 W HCPCS: Performed by: ANESTHESIOLOGY

## 2025-04-13 PROCEDURE — 81025 URINE PREGNANCY TEST: CPT | Performed by: EMERGENCY MEDICINE

## 2025-04-13 PROCEDURE — 63600175 PHARM REV CODE 636 W HCPCS: Performed by: NURSE ANESTHETIST, CERTIFIED REGISTERED

## 2025-04-13 PROCEDURE — 25000003 PHARM REV CODE 250

## 2025-04-13 PROCEDURE — 36000708 HC OR TIME LEV III 1ST 15 MIN: Performed by: STUDENT IN AN ORGANIZED HEALTH CARE EDUCATION/TRAINING PROGRAM

## 2025-04-13 PROCEDURE — 99285 EMERGENCY DEPT VISIT HI MDM: CPT | Mod: 25

## 2025-04-13 PROCEDURE — 81001 URINALYSIS AUTO W/SCOPE: CPT | Performed by: EMERGENCY MEDICINE

## 2025-04-13 PROCEDURE — G0378 HOSPITAL OBSERVATION PER HR: HCPCS

## 2025-04-13 PROCEDURE — 96374 THER/PROPH/DIAG INJ IV PUSH: CPT

## 2025-04-13 PROCEDURE — 99204 OFFICE O/P NEW MOD 45 MIN: CPT | Mod: 57,,, | Performed by: STUDENT IN AN ORGANIZED HEALTH CARE EDUCATION/TRAINING PROGRAM

## 2025-04-13 PROCEDURE — 44970 LAPAROSCOPY APPENDECTOMY: CPT | Mod: ,,, | Performed by: STUDENT IN AN ORGANIZED HEALTH CARE EDUCATION/TRAINING PROGRAM

## 2025-04-13 PROCEDURE — 71000033 HC RECOVERY, INTIAL HOUR: Performed by: STUDENT IN AN ORGANIZED HEALTH CARE EDUCATION/TRAINING PROGRAM

## 2025-04-13 PROCEDURE — 96361 HYDRATE IV INFUSION ADD-ON: CPT

## 2025-04-13 PROCEDURE — 85025 COMPLETE CBC W/AUTO DIFF WBC: CPT | Performed by: EMERGENCY MEDICINE

## 2025-04-13 PROCEDURE — 63600175 PHARM REV CODE 636 W HCPCS: Performed by: STUDENT IN AN ORGANIZED HEALTH CARE EDUCATION/TRAINING PROGRAM

## 2025-04-13 PROCEDURE — 87040 BLOOD CULTURE FOR BACTERIA: CPT | Performed by: STUDENT IN AN ORGANIZED HEALTH CARE EDUCATION/TRAINING PROGRAM

## 2025-04-13 PROCEDURE — 83690 ASSAY OF LIPASE: CPT | Performed by: EMERGENCY MEDICINE

## 2025-04-13 RX ORDER — SODIUM CHLORIDE 0.9 % (FLUSH) 0.9 %
10 SYRINGE (ML) INJECTION
Status: DISCONTINUED | OUTPATIENT
Start: 2025-04-13 | End: 2025-04-15 | Stop reason: HOSPADM

## 2025-04-13 RX ORDER — DEXAMETHASONE SODIUM PHOSPHATE 4 MG/ML
INJECTION, SOLUTION INTRA-ARTICULAR; INTRALESIONAL; INTRAMUSCULAR; INTRAVENOUS; SOFT TISSUE
Status: DISCONTINUED | OUTPATIENT
Start: 2025-04-13 | End: 2025-04-13

## 2025-04-13 RX ORDER — OXYCODONE HYDROCHLORIDE 5 MG/1
5 TABLET ORAL EVERY 4 HOURS PRN
Refills: 0 | Status: DISCONTINUED | OUTPATIENT
Start: 2025-04-13 | End: 2025-04-15 | Stop reason: HOSPADM

## 2025-04-13 RX ORDER — ONDANSETRON HYDROCHLORIDE 2 MG/ML
4 INJECTION, SOLUTION INTRAVENOUS DAILY PRN
Status: DISCONTINUED | OUTPATIENT
Start: 2025-04-13 | End: 2025-04-13 | Stop reason: HOSPADM

## 2025-04-13 RX ORDER — FENTANYL CITRATE 50 UG/ML
INJECTION, SOLUTION INTRAMUSCULAR; INTRAVENOUS
Status: DISCONTINUED | OUTPATIENT
Start: 2025-04-13 | End: 2025-04-13

## 2025-04-13 RX ORDER — TALC
6 POWDER (GRAM) TOPICAL NIGHTLY PRN
Status: DISCONTINUED | OUTPATIENT
Start: 2025-04-13 | End: 2025-04-15 | Stop reason: HOSPADM

## 2025-04-13 RX ORDER — KETOROLAC TROMETHAMINE 30 MG/ML
INJECTION, SOLUTION INTRAMUSCULAR; INTRAVENOUS
Status: DISCONTINUED | OUTPATIENT
Start: 2025-04-13 | End: 2025-04-13

## 2025-04-13 RX ORDER — KETOROLAC TROMETHAMINE 30 MG/ML
15 INJECTION, SOLUTION INTRAMUSCULAR; INTRAVENOUS
Status: COMPLETED | OUTPATIENT
Start: 2025-04-13 | End: 2025-04-13

## 2025-04-13 RX ORDER — DIVALPROEX SODIUM 250 MG/1
250 TABLET, DELAYED RELEASE ORAL EVERY 12 HOURS
Status: DISCONTINUED | OUTPATIENT
Start: 2025-04-13 | End: 2025-04-15 | Stop reason: HOSPADM

## 2025-04-13 RX ORDER — SODIUM CITRATE AND CITRIC ACID MONOHYDRATE 334; 500 MG/5ML; MG/5ML
30 SOLUTION ORAL
OUTPATIENT
Start: 2025-04-13

## 2025-04-13 RX ORDER — METHOCARBAMOL 100 MG/ML
1000 INJECTION, SOLUTION INTRAMUSCULAR; INTRAVENOUS EVERY 8 HOURS
Status: DISCONTINUED | OUTPATIENT
Start: 2025-04-13 | End: 2025-04-15 | Stop reason: HOSPADM

## 2025-04-13 RX ORDER — GLYCOPYRROLATE 0.2 MG/ML
INJECTION INTRAMUSCULAR; INTRAVENOUS
Status: DISCONTINUED | OUTPATIENT
Start: 2025-04-13 | End: 2025-04-13

## 2025-04-13 RX ORDER — GABAPENTIN 300 MG/1
300 CAPSULE ORAL 3 TIMES DAILY
Status: DISCONTINUED | OUTPATIENT
Start: 2025-04-13 | End: 2025-04-15 | Stop reason: HOSPADM

## 2025-04-13 RX ORDER — ONDANSETRON 4 MG/1
8 TABLET, ORALLY DISINTEGRATING ORAL EVERY 8 HOURS PRN
Status: DISCONTINUED | OUTPATIENT
Start: 2025-04-13 | End: 2025-04-15 | Stop reason: HOSPADM

## 2025-04-13 RX ORDER — ONDANSETRON HYDROCHLORIDE 2 MG/ML
4 INJECTION, SOLUTION INTRAVENOUS
Status: COMPLETED | OUTPATIENT
Start: 2025-04-13 | End: 2025-04-13

## 2025-04-13 RX ORDER — METHOCARBAMOL 100 MG/ML
1000 INJECTION, SOLUTION INTRAMUSCULAR; INTRAVENOUS ONCE
Status: COMPLETED | OUTPATIENT
Start: 2025-04-13 | End: 2025-04-13

## 2025-04-13 RX ORDER — BUPIVACAINE HYDROCHLORIDE AND EPINEPHRINE 2.5; 5 MG/ML; UG/ML
INJECTION, SOLUTION EPIDURAL; INFILTRATION; INTRACAUDAL; PERINEURAL
Status: DISCONTINUED | OUTPATIENT
Start: 2025-04-13 | End: 2025-04-13 | Stop reason: HOSPADM

## 2025-04-13 RX ORDER — LIDOCAINE HYDROCHLORIDE 10 MG/ML
1 INJECTION, SOLUTION INFILTRATION; PERINEURAL ONCE AS NEEDED
Status: DISCONTINUED | OUTPATIENT
Start: 2025-04-13 | End: 2025-04-15 | Stop reason: HOSPADM

## 2025-04-13 RX ORDER — OXYCODONE HYDROCHLORIDE 10 MG/1
10 TABLET ORAL EVERY 4 HOURS PRN
Refills: 0 | Status: DISCONTINUED | OUTPATIENT
Start: 2025-04-13 | End: 2025-04-15 | Stop reason: HOSPADM

## 2025-04-13 RX ORDER — PROPOFOL 10 MG/ML
VIAL (ML) INTRAVENOUS
Status: DISCONTINUED | OUTPATIENT
Start: 2025-04-13 | End: 2025-04-13

## 2025-04-13 RX ORDER — CEFAZOLIN SODIUM 1 G/3ML
INJECTION, POWDER, FOR SOLUTION INTRAMUSCULAR; INTRAVENOUS
Status: DISCONTINUED | OUTPATIENT
Start: 2025-04-13 | End: 2025-04-13

## 2025-04-13 RX ORDER — INSULIN ASPART 100 [IU]/ML
0-5 INJECTION, SOLUTION INTRAVENOUS; SUBCUTANEOUS EVERY 4 HOURS PRN
OUTPATIENT
Start: 2025-04-13

## 2025-04-13 RX ORDER — SODIUM CHLORIDE, SODIUM LACTATE, POTASSIUM CHLORIDE, CALCIUM CHLORIDE 600; 310; 30; 20 MG/100ML; MG/100ML; MG/100ML; MG/100ML
INJECTION, SOLUTION INTRAVENOUS CONTINUOUS
OUTPATIENT
Start: 2025-04-13

## 2025-04-13 RX ORDER — MIDAZOLAM HYDROCHLORIDE 2 MG/2ML
2 INJECTION, SOLUTION INTRAMUSCULAR; INTRAVENOUS ONCE AS NEEDED
OUTPATIENT
Start: 2025-04-13 | End: 2036-09-09

## 2025-04-13 RX ORDER — MORPHINE SULFATE 4 MG/ML
4 INJECTION, SOLUTION INTRAMUSCULAR; INTRAVENOUS
Refills: 0 | Status: COMPLETED | OUTPATIENT
Start: 2025-04-13 | End: 2025-04-13

## 2025-04-13 RX ORDER — DIVALPROEX SODIUM 250 MG/1
250 TABLET, FILM COATED, EXTENDED RELEASE ORAL EVERY 12 HOURS
Status: DISCONTINUED | OUTPATIENT
Start: 2025-04-13 | End: 2025-04-13

## 2025-04-13 RX ORDER — ONDANSETRON 4 MG/1
4 TABLET, ORALLY DISINTEGRATING ORAL EVERY 6 HOURS PRN
OUTPATIENT
Start: 2025-04-13

## 2025-04-13 RX ORDER — ONDANSETRON HYDROCHLORIDE 2 MG/ML
INJECTION, SOLUTION INTRAVENOUS
Status: COMPLETED
Start: 2025-04-13 | End: 2025-04-13

## 2025-04-13 RX ORDER — ROCURONIUM BROMIDE 10 MG/ML
INJECTION, SOLUTION INTRAVENOUS
Status: DISCONTINUED | OUTPATIENT
Start: 2025-04-13 | End: 2025-04-13

## 2025-04-13 RX ORDER — ACETAMINOPHEN 325 MG/1
650 TABLET ORAL EVERY 8 HOURS PRN
Status: DISCONTINUED | OUTPATIENT
Start: 2025-04-13 | End: 2025-04-15 | Stop reason: HOSPADM

## 2025-04-13 RX ORDER — NICOTINE 7MG/24HR
1 PATCH, TRANSDERMAL 24 HOURS TRANSDERMAL DAILY
Status: DISCONTINUED | OUTPATIENT
Start: 2025-04-14 | End: 2025-04-15 | Stop reason: HOSPADM

## 2025-04-13 RX ORDER — LIDOCAINE HYDROCHLORIDE 20 MG/ML
INJECTION, SOLUTION EPIDURAL; INFILTRATION; INTRACAUDAL; PERINEURAL
Status: DISCONTINUED | OUTPATIENT
Start: 2025-04-13 | End: 2025-04-13

## 2025-04-13 RX ORDER — SODIUM CHLORIDE 0.9 % (FLUSH) 0.9 %
10 SYRINGE (ML) INJECTION
Status: DISCONTINUED | OUTPATIENT
Start: 2025-04-13 | End: 2025-04-13 | Stop reason: HOSPADM

## 2025-04-13 RX ORDER — MIDAZOLAM HYDROCHLORIDE 1 MG/ML
INJECTION INTRAMUSCULAR; INTRAVENOUS
Status: DISCONTINUED | OUTPATIENT
Start: 2025-04-13 | End: 2025-04-13

## 2025-04-13 RX ORDER — GLUCAGON 1 MG
1 KIT INJECTION
Status: DISCONTINUED | OUTPATIENT
Start: 2025-04-13 | End: 2025-04-13 | Stop reason: HOSPADM

## 2025-04-13 RX ORDER — LIDOCAINE HYDROCHLORIDE 10 MG/ML
1 INJECTION, SOLUTION EPIDURAL; INFILTRATION; INTRACAUDAL; PERINEURAL ONCE
OUTPATIENT
Start: 2025-04-13 | End: 2025-04-13

## 2025-04-13 RX ORDER — ACETAMINOPHEN 325 MG/1
650 TABLET ORAL EVERY 4 HOURS PRN
Status: DISCONTINUED | OUTPATIENT
Start: 2025-04-13 | End: 2025-04-15 | Stop reason: HOSPADM

## 2025-04-13 RX ORDER — ACETAMINOPHEN 10 MG/ML
1000 INJECTION, SOLUTION INTRAVENOUS ONCE
Status: COMPLETED | OUTPATIENT
Start: 2025-04-13 | End: 2025-04-13

## 2025-04-13 RX ORDER — ONDANSETRON HYDROCHLORIDE 2 MG/ML
INJECTION, SOLUTION INTRAVENOUS
Status: DISCONTINUED | OUTPATIENT
Start: 2025-04-13 | End: 2025-04-13

## 2025-04-13 RX ORDER — HYDROMORPHONE HYDROCHLORIDE 2 MG/ML
0.5 INJECTION, SOLUTION INTRAMUSCULAR; INTRAVENOUS; SUBCUTANEOUS EVERY 5 MIN PRN
Status: DISCONTINUED | OUTPATIENT
Start: 2025-04-13 | End: 2025-04-13 | Stop reason: HOSPADM

## 2025-04-13 RX ADMIN — PROPOFOL 150 MG: 10 INJECTION, EMULSION INTRAVENOUS at 01:04

## 2025-04-13 RX ADMIN — KETOROLAC TROMETHAMINE 30 MG: 30 INJECTION, SOLUTION INTRAMUSCULAR; INTRAVENOUS at 02:04

## 2025-04-13 RX ADMIN — KETOROLAC TROMETHAMINE 15 MG: 30 INJECTION, SOLUTION INTRAMUSCULAR; INTRAVENOUS at 09:04

## 2025-04-13 RX ADMIN — LIDOCAINE HYDROCHLORIDE 80 MG: 20 INJECTION, SOLUTION EPIDURAL; INFILTRATION; INTRACAUDAL; PERINEURAL at 02:04

## 2025-04-13 RX ADMIN — SODIUM CHLORIDE, SODIUM GLUCONATE, SODIUM ACETATE, POTASSIUM CHLORIDE AND MAGNESIUM CHLORIDE: 526; 502; 368; 37; 30 INJECTION, SOLUTION INTRAVENOUS at 12:04

## 2025-04-13 RX ADMIN — HYDROMORPHONE HYDROCHLORIDE 0.5 MG: 2 INJECTION INTRAMUSCULAR; INTRAVENOUS; SUBCUTANEOUS at 03:04

## 2025-04-13 RX ADMIN — SODIUM CHLORIDE, POTASSIUM CHLORIDE, SODIUM LACTATE AND CALCIUM CHLORIDE 1000 ML: 600; 310; 30; 20 INJECTION, SOLUTION INTRAVENOUS at 09:04

## 2025-04-13 RX ADMIN — MORPHINE SULFATE 4 MG: 4 INJECTION INTRAVENOUS at 09:04

## 2025-04-13 RX ADMIN — MIDAZOLAM HYDROCHLORIDE 2 MG: 1 INJECTION, SOLUTION INTRAMUSCULAR; INTRAVENOUS at 12:04

## 2025-04-13 RX ADMIN — GABAPENTIN 300 MG: 300 CAPSULE ORAL at 08:04

## 2025-04-13 RX ADMIN — OXYCODONE HYDROCHLORIDE 10 MG: 10 TABLET ORAL at 08:04

## 2025-04-13 RX ADMIN — FENTANYL CITRATE 100 MCG: 50 INJECTION, SOLUTION INTRAMUSCULAR; INTRAVENOUS at 01:04

## 2025-04-13 RX ADMIN — LIDOCAINE HYDROCHLORIDE 40 MG: 20 INJECTION, SOLUTION EPIDURAL; INFILTRATION; INTRACAUDAL; PERINEURAL at 01:04

## 2025-04-13 RX ADMIN — OXYCODONE HYDROCHLORIDE 10 MG: 10 TABLET ORAL at 04:04

## 2025-04-13 RX ADMIN — ROCURONIUM BROMIDE 50 MG: 10 SOLUTION INTRAVENOUS at 01:04

## 2025-04-13 RX ADMIN — PIPERACILLIN AND TAZOBACTAM 4.5 G: 4; .5 INJECTION, POWDER, LYOPHILIZED, FOR SOLUTION INTRAVENOUS; PARENTERAL at 10:04

## 2025-04-13 RX ADMIN — METHOCARBAMOL 1000 MG: 100 INJECTION INTRAMUSCULAR; INTRAVENOUS at 02:04

## 2025-04-13 RX ADMIN — CEFAZOLIN 2 G: 330 INJECTION, POWDER, FOR SOLUTION INTRAMUSCULAR; INTRAVENOUS at 01:04

## 2025-04-13 RX ADMIN — ONDANSETRON 4 MG: 2 INJECTION INTRAMUSCULAR; INTRAVENOUS at 09:04

## 2025-04-13 RX ADMIN — ONDANSETRON 4 MG: 2 INJECTION INTRAMUSCULAR; INTRAVENOUS at 02:04

## 2025-04-13 RX ADMIN — GLYCOPYRROLATE 0.1 MG: 0.2 INJECTION INTRAMUSCULAR; INTRAVENOUS at 12:04

## 2025-04-13 RX ADMIN — SUGAMMADEX 200 MG: 100 INJECTION, SOLUTION INTRAVENOUS at 02:04

## 2025-04-13 RX ADMIN — ONDANSETRON 4 MG: 2 INJECTION INTRAMUSCULAR; INTRAVENOUS at 01:04

## 2025-04-13 RX ADMIN — METHOCARBAMOL 1000 MG: 100 INJECTION INTRAMUSCULAR; INTRAVENOUS at 08:04

## 2025-04-13 RX ADMIN — ACETAMINOPHEN 1000 MG: 10 INJECTION INTRAVENOUS at 02:04

## 2025-04-13 RX ADMIN — DEXAMETHASONE SODIUM PHOSPHATE 4 MG: 4 INJECTION, SOLUTION INTRA-ARTICULAR; INTRALESIONAL; INTRAMUSCULAR; INTRAVENOUS; SOFT TISSUE at 01:04

## 2025-04-13 NOTE — ANESTHESIA PREPROCEDURE EVALUATION
04/13/2025    Thalia Ceballos is a 35 y.o., female past medical history of anxiety, cholecystectomy, tubal ligation who is presenting with 1 day history of abdominal pain     Pre-operative evaluation for Procedure(s) (LRB):  APPENDECTOMY, LAPAROSCOPIC (N/A)    Pre-op Assessment    I have reviewed the Patient Summary Reports.     I have reviewed the Nursing Notes. I have reviewed the NPO Status.   I have reviewed the Medications.     Review of Systems  Anesthesia Hx:  No problems with previous Anesthesia                Cardiovascular:  Exercise tolerance: good                                                 Past Medical History:   Diagnosis Date    Anemia     High cholesterol     Migraines     Mitral valve prolapse        Problem List[1]    Review of patient's allergies indicates:   Allergen Reactions    Bentyl [dicyclomine] Swelling     Other reaction(s): facial swelling w/resp. distress  Other reaction(s): facial swelling w/resp. distress      Fluoxetine Swelling    Iodine Swelling    Taxol [paclitaxel]     Shellfish containing products Hives and Rash     Other reaction(s): swelling of hands and face       Current Outpatient Medications   Medication Instructions    albuterol (PROVENTIL/VENTOLIN HFA) 90 mcg/actuation inhaler 1-2 puffs, Inhalation, Every 6 hours PRN, Rescue    amitriptyline (ELAVIL) 100 mg, Nightly    divalproex ER (DEPAKOTE ER) 250 mg, Oral, Every 12 hours    fenofibrate 160 MG Tab 1 tablet, Daily    gabapentin (NEURONTIN) 300 mg    icosapent ethyL (VASCEPA) 2,000 mg    nicotine (NICODERM CQ) 14 mg/24 hr 1 patch    omega-3 acid ethyl esters (LOVAZA) 1 g, Daily    rizatriptan (MAXALT-MLT) 10 mg, Oral, 2 times daily PRN, May repeat in 2 hours if needed       Past Surgical History:   Procedure Laterality Date    CHOLECYSTECTOMY      In 2011    GALLBLADDER SURGERY  2011    KIDNEY STONE SURGERY  2013    had stent put in to drain and then removed.    TUBAL LIGATION  12/2015    tubes tied  2015  "      Social History[2]    /70   Pulse 79   Temp 36.8 °C (98.3 °F) (Oral)   Resp 16   Ht 5' 4" (1.626 m)   Wt 84.4 kg (186 lb)   LMP 04/10/2025   SpO2 95%   Breastfeeding No   BMI 31.93 kg/m²       Physical Exam  General: Well nourished and Cooperative    Airway:  Mallampati: II   Mouth Opening: Normal  TM Distance: Normal  Tongue: Normal  Neck ROM: Normal ROM    Dental:  Intact    Chest/Lungs:  Clear to auscultation    Heart:  Rhythm: Regular Rhythm        Lab Results   Component Value Date    WBC 15.93 (H) 04/13/2025    HGB 14.0 04/13/2025    HCT 41.8 04/13/2025    MCV 95.0 (H) 04/13/2025     04/13/2025          BMP  Lab Results   Component Value Date    HCT 41.8 04/13/2025     04/13/2025    K 4.5 04/13/2025    BUN 14.4 04/13/2025    CREATININE 0.78 04/13/2025    CALCIUM 9.6 04/13/2025        INR  No results for input(s): "PT", "INR", "PROTIME", "APTT" in the last 72 hours.      Diagnostic Studies:  .  EKG  No results found for this or any previous visit.      Anesthesia Plan  Type of Anesthesia, risks & benefits discussed:    Anesthesia Type: Gen ETT  Intra-op Monitoring Plan: Standard ASA Monitors  Post Op Pain Control Plan: multimodal analgesia  Induction:  rapid sequence and IV  Informed Consent: Informed consent signed with the Patient and all parties understand the risks and agree with anesthesia plan.  All questions answered.   ASA Score: 2 Emergent  Day of Surgery Review of History & Physical: H&P Update referred to the surgeon/provider.    Ready For Surgery From Anesthesia Perspective.     .  Anesthesia consent includes material facts, risks, complications & alternatives, and possibility of altering the anesthesia plan due to intraoperative conditions.    I reviewed problem list, prior to admission medication list, appropriate labs, any workup, Xray, EKG etc   See anesthesia chart for details of the anesthesia plan carried out.       Cam Mcdonnell MD    ¤                [1] "   Patient Active Problem List  Diagnosis    Anxiety and depression    H/O migraine    H/O tubal ligation    Sinusitis    Iron deficiency anemia    Macrocytosis without anemia    Chronic migraine with aura without status migrainosus, not intractable    Medication overuse headache    Current severe episode of major depressive disorder without psychotic features without prior episode   [2]   Social History  Socioeconomic History    Marital status:    Tobacco Use    Smoking status: Every Day     Current packs/day: 1.00     Types: Cigarettes    Smokeless tobacco: Never   Substance and Sexual Activity    Alcohol use: Not Currently    Drug use: Never

## 2025-04-13 NOTE — H&P
Acute Care Surgery   History and Physical     Patient Name: Thalia Ceballos  YOB: 1990  Date: 04/13/2025 11:26 AM  Date of Admission: 4/13/2025  HD#0  POD#* No surgery found *    PRESENTING HISTORY   Chief Complaint/Reason for Admission:  Acute appendicitis  History source(s): patient  History of Present Illness:  35-year-old female with a past medical history of anxiety, cholecystectomy, tubal ligation who is presenting with 1 day history of abdominal pain.  She does have a history of kidney stones and thought this was her kidney stone pain initially.  However, the pain worsened throughout the day.  She did have some nausea and emesis.  No fevers but did have chills.  She has also had a decreased appetite. Last ate at 7p yesterday    CT scan without contrast shows mildly dilated appendix at 6 mm with periappendiceal fat stranding at the tip.  Leukocytosis of 16.     Review of Systems:  12 point ROS negative except as stated in HPI    PAST HISTORY:   Past medical history:  Past Medical History:   Diagnosis Date    Anemia     High cholesterol     Migraines     Mitral valve prolapse        Past surgical history:  Past Surgical History:   Procedure Laterality Date    CHOLECYSTECTOMY      In 2011    GALLBLADDER SURGERY  2011    KIDNEY STONE SURGERY  2013    had stent put in to drain and then removed.    TUBAL LIGATION  12/2015    tubes tied  2015       Family history:  Family History   Problem Relation Name Age of Onset    Diabetes Mother Zabrina Stauffer     Diabetes Maternal Grandmother Jasmina        Social history:  Social History     Socioeconomic History    Marital status:    Tobacco Use    Smoking status: Every Day     Current packs/day: 1.00     Types: Cigarettes    Smokeless tobacco: Never   Substance and Sexual Activity    Alcohol use: Not Currently    Drug use: Never     Tobacco Use History[1]   Social History     Substance and Sexual Activity   Alcohol Use Not Currently         MEDICATIONS & ALLERGIES:     No current facility-administered medications on file prior to encounter.     Current Outpatient Medications on File Prior to Encounter   Medication Sig    albuterol (PROVENTIL/VENTOLIN HFA) 90 mcg/actuation inhaler Inhale 1-2 puffs into the lungs every 6 (six) hours as needed for Wheezing (cough). Rescue    amitriptyline (ELAVIL) 100 MG tablet Take 100 mg by mouth every evening. (Patient not taking: Reported on 1/29/2025)    divalproex ER (DEPAKOTE ER) 250 MG 24 hr tablet Take 1 tablet (250 mg total) by mouth every 12 (twelve) hours.    fenofibrate 160 MG Tab Take 1 tablet by mouth once daily.    gabapentin (NEURONTIN) 300 MG capsule Take 300 mg by mouth.    icosapent ethyL (VASCEPA) 1 gram Cap Take 2,000 mg by mouth.    nicotine (NICODERM CQ) 14 mg/24 hr 1 patch.    omega-3 acid ethyl esters (LOVAZA) 1 gram capsule Take 1 g by mouth once daily.    rizatriptan (MAXALT-MLT) 10 MG disintegrating tablet Take 1 tablet (10 mg total) by mouth 2 (two) times daily as needed for Migraine. May repeat in 2 hours if needed     Allergies:   Review of patient's allergies indicates:   Allergen Reactions    Bentyl [dicyclomine] Swelling     Other reaction(s): facial swelling w/resp. distress  Other reaction(s): facial swelling w/resp. distress      Fluoxetine Swelling    Iodine Swelling    Taxol [paclitaxel]     Shellfish containing products Hives and Rash     Other reaction(s): swelling of hands and face     Scheduled Meds:   divalproex ER  250 mg Oral Q12H    piperacillin-tazobactam (Zosyn) IV (PEDS and ADULTS) (extended infusion is not appropriate)  4.5 g Intravenous Q8H     Continuous Infusions:  PRN Meds:  Current Facility-Administered Medications:     acetaminophen, 650 mg, Oral, Q8H PRN    acetaminophen, 650 mg, Oral, Q4H PRN    LIDOcaine HCL 10 mg/ml (1%), 1 mL, Intradermal, Once PRN    melatonin, 6 mg, Oral, Nightly PRN    ondansetron, 8 mg, Oral, Q8H PRN    oxyCODONE, 5 mg, Oral, Q4H PRN    " oxyCODONE, 10 mg, Oral, Q4H PRN    sodium chloride 0.9%, 10 mL, Intravenous, PRN    OBJECTIVE:   Vital Signs:  VITAL SIGNS: 24 HR MIN & MAX LAST   Temp  Min: 98.2 °F (36.8 °C)  Max: 98.2 °F (36.8 °C)  98.2 °F (36.8 °C)   BP  Min: 108/79  Max: 115/79  108/79    Pulse  Min: 83  Max: 93  83    Resp  Min: 16  Max: 20  16    SpO2  Min: 96 %  Max: 98 %  97 %      HT: 5' 4" (162.6 cm)  WT: 84.4 kg (186 lb)  BMI: 31.9     Intake/output:  Intake/Output - Last 3 Shifts       None          No intake or output data in the 24 hours ending 04/13/25 1126      Physical Exam:  General: Well developed, well nourished, no acute distress  HEENT: Normocephalic, PERRL  CV: RR  Resp: NWOB  GI:  Abdomen soft, non-distended, no guarding, no rebound.  Tenderness to palpation in the right lower quadrant, voluntary guarding.  :  Deferred  MSK: No muscle atrophy, cyanosis, peripheral edema, moving all extremities spontaneously  Skin/wounds:  No rashes, ulcers, erythema  Neuro:  CNII-XII grossly intact, alert and oriented to person, place, and time    Labs:  Troponin:  No results for input(s): "TROPONINI" in the last 72 hours.  CBC:  Recent Labs     04/13/25  0908   WBC 15.93*   RBC 4.40   HGB 14.0   HCT 41.8      MCV 95.0*   MCH 31.8*   MCHC 33.5     CMP:  Recent Labs     04/13/25  0956   CALCIUM 9.6   ALBUMIN 3.8      K 4.5   CO2 23   *   BUN 14.4   CREATININE 0.78   ALKPHOS 56   ALT 21   AST 14   BILITOT 0.1     Lactic Acid:  Recent Labs     04/13/25  1013   LACTATE 1.5     ETOH:  No results for input(s): "ETHANOL" in the last 72 hours.   Urine Drug Screen:  No results for input(s): "COCAINE", "OPIATE", "BARBITURATE", "AMPHETAMINE", "FENTANYL", "CANNABINOIDS", "MDMA" in the last 72 hours.    Invalid input(s): "BENZODIAZEPINE", "PHENCYCLIDINE"   ABG:  No results for input(s): "PH", "PO2", "PCO2", "HCO3", "BE" in the last 168 hours.   I have reviewed all pertinent lab results within the past 24 hours.    Diagnostic " Results:  Imaging Results              CT Abdomen Pelvis  Without Contrast (Final result)  Result time 04/13/25 10:01:16      Final result by King Saeed MD (04/13/25 10:01:16)                   Impression:      1. Appendiceal diameter upper limits of normal with suspected minimal inflammatory strandings around the tip of the appendix.  Findings are equivocal for early acute appendicitis on this noncontrast exam.  Clinical correlation is recommended.    2. Details of other findings above..    Findings were discussed with Dr. Mcdermott April 13, 2025 at 1000 hours.      Electronically signed by: King Saeed  Date:    04/13/2025  Time:    10:01               Narrative:    EXAMINATION:  CT ABDOMEN PELVIS WITHOUT CONTRAST    CLINICAL HISTORY:  RLQ abdominal pain (Age >= 14y);contrast allergy;    TECHNIQUE:  Multidetector axial images were obtained from the  diaphragms to below symphysis pubis without the administration of IV contrast. Oral contrast was not administered.    Dose length product of 702 mGycm. Automated exposure control was utilized to minimize radiation dose.    COMPARISON:  July 17, 2014    FINDINGS:  Included lungs are without suspicious nodularity, acute air space infiltrates or fluid within the pleural spaces.    Within limitations of noncontrast technique, no acute findings of the liver, pancreas and spleen identified. Gallbladder wall is not thickened and there is no intraluminal calcified calculus. No apparent biliary dilation.    The adrenal glands noncontrast evaluation is unremarkable. The kidneys are unremarkable in size and contour. There is no hydronephrosis or nephrolithiasis. The ureters appear normal in course and diameter without intra ureteral stone.    Appendiceal diameter is upper limits of normal at 6 mm and is seen from a image 103 to image 101 series 2.  Around the very tip of the appendix, there is suspected minimal inflammatory strandings on image 101 series 2.  Findings are  equivocal for early acute appendicitis though there are limitations without intravenous contrast.  No free air or free fluid.  Stomach is filled with residual ingested contents.  Small bowel loops are without abnormal dilatation.  Colon is nondistended there are no pericolonic acute strandings.  No bowel obstruction.    Urinary bladder wall is not thickened..  No intravesical stone identified.  There is similar appearance of the uterus.  Endometrium is not delineated.  There is small pelvic free fluid.                                     I have reviewed all pertinent imaging results/findings within the past 24 hours.    ASSESSMENT & PLAN:    35-year-old female with a history of anxiety, tubal ligation, laparoscopic cholecystectomy who was presenting with acute appendicitis    -admit to General surgery team   -we will plan for laparoscopic appendectomy today.  Risks, benefits, alternatives discussed with the patient at bedside and verbal and written consent were obtained.  -NPO   -Lola Sebastian MD  [employer] General Surgery PGY4                [1]   Social History  Tobacco Use   Smoking Status Every Day    Current packs/day: 1.00    Types: Cigarettes   Smokeless Tobacco Never

## 2025-04-13 NOTE — BRIEF OP NOTE
[facility] - Periop Services  Surgery Department  Operative Note    SUMMARY     Date of Procedure: 4/13/2025     Procedure: Procedure(s) (LRB):  APPENDECTOMY, LAPAROSCOPIC (N/A)     Surgeons and Role:     * Hugh Winn MD - Primary     * Laila Sebastian MD - Resident - Assisting        Pre-Operative Diagnosis: Acute appendicitis with localized peritonitis, without perforation, abscess, or gangrene [K35.30]    Post-Operative Diagnosis: Post-Op Diagnosis Codes:     * Acute appendicitis with localized peritonitis, without perforation, abscess, or gangrene [K35.30]    Anesthesia: General    Operative Findings (including complications, if any):  Mildly inflamed appendix consistent with early acute appendicitis           Implants: * No implants in log *    Specimens:   Specimen (24h ago, onward)       Start     Ordered    04/13/25 1404  Specimen to Pathology  RELEASE UPON ORDERING        References:    Click here for ordering Quick Tip   Question:  Release to patient  Answer:  Immediate    04/13/25 1404                   ID Type Source Tests Collected by Time Destination   A :  Tissue Appendix SPECIMEN TO PATHOLOGY Hugh Winn MD 4/13/2025 1403               Condition: Stable    Disposition: PACU - hemodynamically stable.        Laila Sebastian MD  [employer] General Surgery PGY4

## 2025-04-13 NOTE — TRANSFER OF CARE
"Anesthesia Transfer of Care Note    Patient: Thalia Ceballos    Procedure(s) Performed: Procedure(s) (LRB):  APPENDECTOMY, LAPAROSCOPIC (N/A)    Patient location: PACU    Anesthesia Type: general    Transport from OR: Transported from OR on room air with adequate spontaneous ventilation    Post pain: adequate analgesia    Post assessment: no apparent anesthetic complications    Post vital signs: stable    Level of consciousness: sedated    Nausea/Vomiting: no nausea/vomiting    Complications: none    Transfer of care protocol was followed      Last vitals: Visit Vitals  /81   Pulse 73   Temp 36.8 °C (98.3 °F) (Oral)   Resp 18   Ht 5' 4" (1.626 m)   Wt 84.4 kg (186 lb)   LMP 04/10/2025   SpO2 97%   Breastfeeding No   BMI 31.93 kg/m²     "

## 2025-04-13 NOTE — ANESTHESIA PROCEDURE NOTES
Intubation    Date/Time: 4/13/2025 1:10 PM    Performed by: Yanick Garcia CRNA  Authorized by: Cam Mcdonnell MD    Intubation:     Induction:  Intravenous    Intubated:  Postinduction    Mask Ventilation:  Easy mask    Attempts:  1    Attempted By:  CRNA    Method of Intubation:  Direct    Blade:  Ba 2    Laryngeal View Grade: Grade IIA - cords partially seen      Difficult Airway Encountered?: No      Complications:  None    Airway Device:  Oral endotracheal tube    Airway Device Size:  7.0    Style/Cuff Inflation:  Cuffed    Tube secured:  22    Secured at:  The lips    Placement Verified By:  Capnometry    Complicating Factors:  None    Findings Post-Intubation:  BS equal bilateral

## 2025-04-13 NOTE — ANESTHESIA POSTPROCEDURE EVALUATION
Anesthesia Post Evaluation    Patient: Thalia Ceballos    Procedure(s) Performed: Procedure(s) (LRB):  APPENDECTOMY, LAPAROSCOPIC (N/A)    Final Anesthesia Type: general      Patient location during evaluation: PACU  Patient participation: Yes- Able to Participate  Post-procedure mental status: @ basline.  Pain management: adequate  AQI66 YVETTE Mitigation: See pacu RN note for any measures applied.  PONV status: See postop meds for drugs used to control n/v if any.  Anesthetic complications: no      Cardiovascular status: stable  Respiratory status: @ baseline.  Hydration status: euvolemic                Vitals Value Taken Time   /77 04/13/25 15:32   Temp 36.8 °C (98.3 °F) 04/13/25 15:52   Pulse 79 04/13/25 15:52   Resp 18 04/13/25 15:52   SpO2 97 % 04/13/25 15:52         Event Time   Out of Recovery 15:23:33         Pain/Theodore Score: Pain Rating Prior to Med Admin: 7 (4/13/2025  3:16 PM)  Pain Rating Post Med Admin: 5 (4/13/2025  3:46 PM)  Theodore Score: 9 (4/13/2025  3:15 PM)

## 2025-04-13 NOTE — ED PROVIDER NOTES
Encounter Date: 4/13/2025    SCRIBE #1 NOTE: I, Alyse Love, am scribing for, and in the presence of,  Ludwin Mcdermott MD. I have scribed the following portions of the note - Other sections scribed: HPI, ROS, PE.       History     Chief Complaint   Patient presents with    Abdominal Pain     Pt co right flank pain radiating to right abd pain since last night with nausea, vomiting, diarrhea. Denies fever, urinary issues     Patient is a 35 year old female with a past medical history of mitral valve prolapse, high cholesterol, and kidney stones presenting to the ED for evaluation of worsening abdominal pain with associated nausea and vomiting onset yesterday morning. Patient states this feels similar to previous kidney stones. She denies fever chills, or dysuria.    The history is provided by the patient. No  was used.     Review of patient's allergies indicates:   Allergen Reactions    Bentyl [dicyclomine] Swelling     Other reaction(s): facial swelling w/resp. distress  Other reaction(s): facial swelling w/resp. distress      Fluoxetine Swelling    Iodine Swelling    Taxol [paclitaxel]     Shellfish containing products Hives and Rash     Other reaction(s): swelling of hands and face     Past Medical History:   Diagnosis Date    Anemia     High cholesterol     Migraines     Mitral valve prolapse      Past Surgical History:   Procedure Laterality Date    CHOLECYSTECTOMY      In 2011    GALLBLADDER SURGERY  2011    KIDNEY STONE SURGERY  2013    had stent put in to drain and then removed.    TUBAL LIGATION  12/2015    tubes tied  2015     Family History   Problem Relation Name Age of Onset    Diabetes Mother Zabrina Stauffer     Diabetes Maternal Grandmother Jasmina      Social History[1]  Review of Systems   Constitutional:  Negative for chills and fever.   HENT:  Negative for congestion, drooling and sore throat.    Eyes:  Negative for pain and visual disturbance.   Respiratory:  Negative  for chest tightness, shortness of breath and wheezing.    Cardiovascular:  Negative for chest pain, palpitations and leg swelling.   Gastrointestinal:  Positive for abdominal pain, nausea and vomiting.   Genitourinary:  Negative for dysuria and hematuria.   Musculoskeletal:  Negative for back pain, neck pain and neck stiffness.   Skin:  Negative for pallor and rash.   Neurological:  Negative for weakness and numbness.   Hematological:  Does not bruise/bleed easily.       Physical Exam     Initial Vitals [04/13/25 0848]   BP Pulse Resp Temp SpO2   115/71 92 20 98.2 °F (36.8 °C) 96 %      MAP       --         Physical Exam    Nursing note and vitals reviewed.  Constitutional: She appears well-developed and well-nourished. She is not diaphoretic.   Tearful  Uncomfortable appearing    HENT:   Head: Normocephalic and atraumatic.   Nose: Nose normal. Mouth/Throat: Oropharynx is clear and moist. Mucous membranes are dry.   Eyes: EOM are normal. Pupils are equal, round, and reactive to light.   Neck: Neck supple.   Normal range of motion.  Cardiovascular:  Regular rhythm.   Tachycardia present.         No murmur heard.  Pulmonary/Chest: Breath sounds normal. No respiratory distress. She has no wheezes. She has no rales.   Abdominal: Abdomen is soft. She exhibits no distension. There is abdominal tenderness in the right lower quadrant.   There is right CVA tenderness.There is no rebound and no guarding.   Musculoskeletal:         General: No edema. Normal range of motion.      Cervical back: Normal range of motion and neck supple.      Comments: Right flank tenderness     Neurological: She is alert and oriented to person, place, and time. She has normal strength. No cranial nerve deficit or sensory deficit.   Skin: Skin is warm. Capillary refill takes less than 2 seconds. No rash noted.         ED Course   Critical Care    Date/Time: 4/13/2025 11:04 AM    Performed by: Ludwin Mcdermott MD  Authorized by: Baldemar  Ludwin GARZA MD  Direct patient critical care time: 35 minutes  Total critical care time (exclusive of procedural time) : 35 minutes  Critical care time was exclusive of separately billable procedures and treating other patients.  Critical care was necessary to treat or prevent imminent or life-threatening deterioration of the following conditions: sepsis.  Critical care was time spent personally by me on the following activities: development of treatment plan with patient or surrogate, discussions with consultants, evaluation of patient's response to treatment, obtaining history from patient or surrogate, examination of patient, ordering and performing treatments and interventions, ordering and review of laboratory studies, ordering and review of radiographic studies, pulse oximetry and re-evaluation of patient's condition.        Labs Reviewed   COMPREHENSIVE METABOLIC PANEL - Abnormal       Result Value    Sodium 139      Potassium 4.5      Chloride 108 (*)     CO2 23      Glucose 116 (*)     Blood Urea Nitrogen 14.4      Creatinine 0.78      Calcium 9.6      Protein Total 7.0      Albumin 3.8      Globulin 3.2      Albumin/Globulin Ratio 1.2      Bilirubin Total 0.1      ALP 56      ALT 21      AST 14      eGFR >60      Anion Gap 8.0      BUN/Creatinine Ratio 18     URINALYSIS, REFLEX TO URINE CULTURE - Abnormal    Color, UA Light-Yellow      Appearance, UA Turbid (*)     Specific Gravity, UA 1.022      pH, UA 6.5      Protein, UA Negative      Glucose, UA Normal      Ketones, UA Negative      Blood, UA Trace (*)     Bilirubin, UA Negative      Urobilinogen, UA Normal      Nitrites, UA Negative      Leukocyte Esterase, UA Negative      RBC, UA 0-5      WBC, UA 0-5      Bacteria, UA None Seen      Squamous Epithelial Cells, UA Few (*)    CBC WITH DIFFERENTIAL - Abnormal    WBC 15.93 (*)     RBC 4.40      Hgb 14.0      Hct 41.8      MCV 95.0 (*)     MCH 31.8 (*)     MCHC 33.5      RDW 12.9      Platelet 396       MPV 10.3      Neut % 55.0      Lymph % 26.5      Mono % 7.4      Eos % 9.5      Basophil % 0.8      Imm Grans % 0.8      Neut # 8.76      Lymph # 4.22      Mono # 1.18      Eos # 1.52 (*)     Baso # 0.12      Imm Gran # 0.13 (*)     NRBC% 0.0     LIPASE - Normal    Lipase Level 32     LACTIC ACID, PLASMA - Normal    Lactic Acid Level 1.5     BLOOD CULTURE [facility]   BLOOD CULTURE [facility]   CBC W/ AUTO DIFFERENTIAL    Narrative:     The following orders were created for panel order CBC auto differential.  Procedure                               Abnormality         Status                     ---------                               -----------         ------                     CBC with Differential[[id number]]       Abnormal            Final result                 Please view results for these tests on the individual orders.   POCT URINE PREGNANCY    POC Preg Test, Ur Negative       Acceptable Yes            Imaging Results              CT Abdomen Pelvis  Without Contrast (Final result)  Result time 04/13/25 10:01:16      Final result by King Saeed MD (04/13/25 10:01:16)                   Impression:      1. Appendiceal diameter upper limits of normal with suspected minimal inflammatory strandings around the tip of the appendix.  Findings are equivocal for early acute appendicitis on this noncontrast exam.  Clinical correlation is recommended.    2. Details of other findings above..    Findings were discussed with Dr. Mcdermott April 13, 2025 at 1000 hours.      Electronically signed by: King Saeed  Date:    04/13/2025  Time:    10:01               Narrative:    EXAMINATION:  CT ABDOMEN PELVIS WITHOUT CONTRAST    CLINICAL HISTORY:  RLQ abdominal pain (Age >= 14y);contrast allergy;    TECHNIQUE:  Multidetector axial images were obtained from the  diaphragms to below symphysis pubis without the administration of IV contrast. Oral contrast was not administered.    Dose length product of 702 mGycm. Automated  exposure control was utilized to minimize radiation dose.    COMPARISON:  July 17, 2014    FINDINGS:  Included lungs are without suspicious nodularity, acute air space infiltrates or fluid within the pleural spaces.    Within limitations of noncontrast technique, no acute findings of the liver, pancreas and spleen identified. Gallbladder wall is not thickened and there is no intraluminal calcified calculus. No apparent biliary dilation.    The adrenal glands noncontrast evaluation is unremarkable. The kidneys are unremarkable in size and contour. There is no hydronephrosis or nephrolithiasis. The ureters appear normal in course and diameter without intra ureteral stone.    Appendiceal diameter is upper limits of normal at 6 mm and is seen from a image 103 to image 101 series 2.  Around the very tip of the appendix, there is suspected minimal inflammatory strandings on image 101 series 2.  Findings are equivocal for early acute appendicitis though there are limitations without intravenous contrast.  No free air or free fluid.  Stomach is filled with residual ingested contents.  Small bowel loops are without abnormal dilatation.  Colon is nondistended there are no pericolonic acute strandings.  No bowel obstruction.    Urinary bladder wall is not thickened..  No intravesical stone identified.  There is similar appearance of the uterus.  Endometrium is not delineated.  There is small pelvic free fluid.                                       Medications   divalproex ER 24 hr tablet 250 mg (has no administration in time range)   LIDOcaine HCL 10 mg/ml (1%) injection 1 mL (has no administration in time range)   sodium chloride 0.9% flush 10 mL (has no administration in time range)   ondansetron disintegrating tablet 8 mg (has no administration in time range)   melatonin tablet 6 mg (has no administration in time range)   acetaminophen tablet 650 mg (has no administration in time range)   acetaminophen tablet 650 mg (has no  administration in time range)   oxyCODONE immediate release tablet 5 mg (has no administration in time range)   oxyCODONE immediate release tablet Tab 10 mg (has no administration in time range)   piperacillin-tazobactam (ZOSYN) 4.5 g in D5W 100 mL IVPB (MB+) (has no administration in time range)   lactated ringers bolus 1,000 mL (0 mLs Intravenous Stopped 4/13/25 1010)   morphine injection 4 mg (4 mg Intravenous Given 4/13/25 0917)   ondansetron injection 4 mg (4 mg Intravenous Given 4/13/25 0917)   ketorolac injection 15 mg (15 mg Intravenous Given 4/13/25 0955)   piperacillin-tazobactam (ZOSYN) 4.5 g in D5W 100 mL IVPB (MB+) (0 g Intravenous Stopped 4/13/25 1100)     Medical Decision Making  Amount and/or Complexity of Data Reviewed  Labs: ordered.  Radiology: ordered.    Risk  Prescription drug management.  Decision regarding hospitalization.    Differential diagnosis (includes but is not limited to):   Urolithiasis, urinary infection, pyelonephritis, appendicitis, intra-abdominal infection, dehydration, kidney injury, musculoskeletal pain    MDM Narrative  35-year-old female presents for right lower quadrant pain that radiates to her right flank that started last night and has persisted into today.  She reports ongoing nausea and an episode of vomiting this morning.  She denies any fevers at home.  Labs reviewed, leukocytosis of 15.9 noted.  CTA of the abdomen pelvis performed, radiology contacted me and state they do not see any evidence of kidney obstruction or inflammation however they are concerned for possible acute early appendicitis due to appendix being dilated to the upper range of normal and some inflammatory changes at the tip of the appendix.  Given this, Zosyn ordered, general surgery consulted.  General surgery has evaluated the patient and will admit for definitive management.  Patient remains afebrile and hemodynamically stable.  Lactic acid within normal limits.  On reassessment, perfusion  "appears adequate, no indication for vasopressors, continue antibiotics and IV fluid resuscitation with the pain control.    Dispo: Admit    My independent radiology interpretation: as above  Point of care US (independently performed and interpreted):   Decision rules/clinical scoring:     Sepsis Perfusion Assessment: "I attest a sepsis perfusion exam was performed within 6 hours of sepsis, severe sepsis, or septic shock presentation, following fluid resuscitation."     Amount and/or Complexity of Data Reviewed  Independent historian: none   Summary of history:   External data reviewed: notes from previous ED visits and notes from clinic visits  Summary of data reviewed: Prior records reviewed  Risk and benefits of testing: discussed   Labs: ordered and reviewed  Radiology: ordered and independent interpretation performed (see above or ED course)  ECG/medicine tests: ordered and independent interpretation performed (see above or ED course)  Discussion of management or test interpretation with external provider(s): discussed with surgery consultant   Summary of discussion: as above    Risk  Parenteral controlled substances   Drug therapy requiring intense monitoring for toxicity   Decision regarding hospitalization  Shared decision making     Critical Care  30-74 minutes     Data Reviewed/Counseling: I have personally reviewed the patient's vital signs, nursing notes, and other relevant tests, information, and imaging. I had a detailed discussion regarding the historical points, exam findings, and any diagnostic results supporting the discharge diagnosis. I personally performed the history, PE, MDM and procedures as documented above and agree with the scribe's documentation.    Portions of this note were dictated using voice recognition software. Although it was reviewed for accuracy, some inherent voice recognition errors may have occurred and may be present in this document.          Scribe Attestation:   Scribe #1: " I performed the above scribed service and the documentation accurately describes the services I performed. I attest to the accuracy of the note.    Attending Attestation:           Physician Attestation for Scribe:  Physician Attestation Statement for Scribe #1: I, Ludwin Mcdermott MD, reviewed documentation, as scribed by Alyse Love in my presence, and it is both accurate and complete.             ED Course as of 04/13/25 1106   Sun Apr 13, 2025   1000 Paged general surgery [AB]      ED Course User Index  [AB] Alyse Love                           Clinical Impression:  Final diagnoses:  [K35.30] Acute appendicitis with localized peritonitis, without perforation, abscess, or gangrene (Primary)  [R10.31] Acute right lower quadrant pain  [R10.9] Acute right flank pain  [D72.829] Leukocytosis, unspecified type  [R65.10] SIRS (systemic inflammatory response syndrome)          ED Disposition Condition    Admit Stable                    [1]   Social History  Tobacco Use    Smoking status: Every Day     Current packs/day: 1.00     Types: Cigarettes    Smokeless tobacco: Never   Substance Use Topics    Alcohol use: Not Currently    Drug use: Never        Ludwin Mcdermott MD  04/13/25 1106

## 2025-04-14 PROBLEM — K35.30 ACUTE APPENDICITIS WITH LOCALIZED PERITONITIS, WITHOUT PERFORATION, ABSCESS, OR GANGRENE: Status: ACTIVE | Noted: 2025-04-14

## 2025-04-14 LAB
ABS NEUT (OLG): 23.6 X10(3)/MCL (ref 2.1–9.2)
EOSINOPHIL NFR BLD MANUAL: 0.27 X10(3)/MCL (ref 0–0.9)
EOSINOPHIL NFR BLD MANUAL: 1 %
ERYTHROCYTE [DISTWIDTH] IN BLOOD BY AUTOMATED COUNT: 12.8 % (ref 11.5–17)
HCT VFR BLD AUTO: 40.2 % (ref 37–47)
HGB BLD-MCNC: 13.4 G/DL (ref 12–16)
INSTRUMENT WBC (OLG): 27.13 X10(3)/MCL
LYMPHOCYTES NFR BLD MANUAL: 1.9 X10(3)/MCL (ref 0.6–4.6)
LYMPHOCYTES NFR BLD MANUAL: 7 %
MCH RBC QN AUTO: 32.1 PG (ref 27–31)
MCHC RBC AUTO-ENTMCNC: 33.3 G/DL (ref 33–36)
MCV RBC AUTO: 96.4 FL (ref 80–94)
METAMYELOCYTES NFR BLD MANUAL: 1 %
MONOCYTES NFR BLD MANUAL: 1.09 X10(3)/MCL (ref 0.1–1.3)
MONOCYTES NFR BLD MANUAL: 4 %
NEUTROPHILS NFR BLD MANUAL: 87 %
PLATELET # BLD AUTO: 368 X10(3)/MCL (ref 130–400)
PLATELET # BLD EST: NORMAL 10*3/UL
PMV BLD AUTO: 9.3 FL (ref 7.4–10.4)
RBC # BLD AUTO: 4.17 X10(6)/MCL (ref 4.2–5.4)
RBC MORPH BLD: NORMAL
WBC # BLD AUTO: 27.13 X10(3)/MCL (ref 4.5–11.5)

## 2025-04-14 PROCEDURE — 25000003 PHARM REV CODE 250: Performed by: STUDENT IN AN ORGANIZED HEALTH CARE EDUCATION/TRAINING PROGRAM

## 2025-04-14 PROCEDURE — 25000003 PHARM REV CODE 250

## 2025-04-14 PROCEDURE — G0378 HOSPITAL OBSERVATION PER HR: HCPCS

## 2025-04-14 PROCEDURE — 63600175 PHARM REV CODE 636 W HCPCS

## 2025-04-14 PROCEDURE — S4991 NICOTINE PATCH NONLEGEND: HCPCS | Performed by: STUDENT IN AN ORGANIZED HEALTH CARE EDUCATION/TRAINING PROGRAM

## 2025-04-14 PROCEDURE — 96374 THER/PROPH/DIAG INJ IV PUSH: CPT | Mod: 59

## 2025-04-14 PROCEDURE — 99900031 HC PATIENT EDUCATION (STAT)

## 2025-04-14 PROCEDURE — 96375 TX/PRO/DX INJ NEW DRUG ADDON: CPT

## 2025-04-14 PROCEDURE — 85025 COMPLETE CBC W/AUTO DIFF WBC: CPT

## 2025-04-14 PROCEDURE — 94760 N-INVAS EAR/PLS OXIMETRY 1: CPT

## 2025-04-14 PROCEDURE — 94799 UNLISTED PULMONARY SVC/PX: CPT

## 2025-04-14 PROCEDURE — 36415 COLL VENOUS BLD VENIPUNCTURE: CPT

## 2025-04-14 PROCEDURE — 99900035 HC TECH TIME PER 15 MIN (STAT)

## 2025-04-14 RX ORDER — SIMETHICONE 80 MG
1 TABLET,CHEWABLE ORAL 3 TIMES DAILY PRN
Status: DISCONTINUED | OUTPATIENT
Start: 2025-04-14 | End: 2025-04-15 | Stop reason: HOSPADM

## 2025-04-14 RX ORDER — METHOCARBAMOL 500 MG/1
500 TABLET, FILM COATED ORAL 3 TIMES DAILY
Qty: 21 TABLET | Refills: 0 | Status: SHIPPED | OUTPATIENT
Start: 2025-04-14 | End: 2025-04-21

## 2025-04-14 RX ORDER — OXYCODONE HYDROCHLORIDE 10 MG/1
10 TABLET ORAL EVERY 8 HOURS PRN
Qty: 10 TABLET | Refills: 0 | Status: SHIPPED | OUTPATIENT
Start: 2025-04-14 | End: 2025-04-17

## 2025-04-14 RX ORDER — LIDOCAINE 50 MG/G
1 PATCH TOPICAL
Status: DISCONTINUED | OUTPATIENT
Start: 2025-04-14 | End: 2025-04-15 | Stop reason: HOSPADM

## 2025-04-14 RX ADMIN — SIMETHICONE 80 MG: 80 TABLET, CHEWABLE ORAL at 11:04

## 2025-04-14 RX ADMIN — GABAPENTIN 300 MG: 300 CAPSULE ORAL at 08:04

## 2025-04-14 RX ADMIN — DIVALPROEX SODIUM 250 MG: 250 TABLET, DELAYED RELEASE ORAL at 08:04

## 2025-04-14 RX ADMIN — OXYCODONE HYDROCHLORIDE 10 MG: 10 TABLET ORAL at 02:04

## 2025-04-14 RX ADMIN — METHOCARBAMOL 1000 MG: 100 INJECTION INTRAMUSCULAR; INTRAVENOUS at 02:04

## 2025-04-14 RX ADMIN — LIDOCAINE 1 PATCH: 50 PATCH CUTANEOUS at 03:04

## 2025-04-14 RX ADMIN — PIPERACILLIN AND TAZOBACTAM 4.5 G: 4; .5 INJECTION, POWDER, LYOPHILIZED, FOR SOLUTION INTRAVENOUS; PARENTERAL at 11:04

## 2025-04-14 RX ADMIN — OXYCODONE HYDROCHLORIDE 5 MG: 5 TABLET ORAL at 08:04

## 2025-04-14 RX ADMIN — METHOCARBAMOL 1000 MG: 100 INJECTION INTRAMUSCULAR; INTRAVENOUS at 05:04

## 2025-04-14 RX ADMIN — GABAPENTIN 300 MG: 300 CAPSULE ORAL at 02:04

## 2025-04-14 RX ADMIN — PIPERACILLIN AND TAZOBACTAM 4.5 G: 4; .5 INJECTION, POWDER, LYOPHILIZED, FOR SOLUTION INTRAVENOUS; PARENTERAL at 04:04

## 2025-04-14 RX ADMIN — NICOTINE 1 PATCH: 7 PATCH, EXTENDED RELEASE TRANSDERMAL at 08:04

## 2025-04-14 RX ADMIN — OXYCODONE HYDROCHLORIDE 5 MG: 5 TABLET ORAL at 07:04

## 2025-04-14 RX ADMIN — METHOCARBAMOL 1000 MG: 100 INJECTION INTRAMUSCULAR; INTRAVENOUS at 10:04

## 2025-04-14 NOTE — DISCHARGE SUMMARY
[facility] [address]  General Surgery  Discharge Summary      Patient Name: Thalia Ceballos  MRN: [id number]  Admission Date: 4/13/2025  Hospital Length of Stay: 1 days  Discharge Date and Time: 04/14/2025 1:29 PM  Attending Physician: Juan Jones MD   Discharging Provider: Maura Gan PA-C  Primary Care Provider: Merline Kuo FNP    HPI:   No notes on file    Procedure(s) (LRB):  APPENDECTOMY, LAPAROSCOPIC (N/A)      Indwelling Lines/Drains at time of discharge:   Lines/Drains/Airways       None                 Hospital Course: Thalia Ceballos is a 35-year-old female admitted with acute appendicitis s/p lap appendectomy 4/13 with Dr. Winn.  Tolerated diet.  Pain controlled.  Ambulating.  Patient verbalized understanding of all discharge instructions, new prescription usage, signs and symptoms to be aware of for immediate evaluation.  Follow up acute care surgery TeleMed and PCP.    Goals of Care Treatment Preferences:  Code Status: Full Code      Consults:     Significant Diagnostic Studies: N/A    Pending Diagnostic Studies:       Procedure Component Value Units Date/Time    Specimen to Pathology [[id number]] Collected: 04/13/25 1403    Order Status: Sent Lab Status: In process Updated: 04/14/25 0839    Specimen: Tissue from Appendix           Final Active Diagnoses:    Diagnosis Date Noted POA    PRINCIPAL PROBLEM:  Acute appendicitis with localized peritonitis, without perforation, abscess, or gangrene [K35.30] 04/14/2025 Yes      Problems Resolved During this Admission:      Discharged Condition: good    Disposition: Home or Self Care    Follow Up:   Follow-up Information       Clinic, Acute Care Surgery Follow up.    Contact information:  [address]  [address]  [facility] [address]  [phone]               Merline Kuo FNP Follow up in 1 week(s).    Specialties: Hospitalist, Family Medicine  Contact information:  [address]  [address]  [facility]  [address]  [phone]                           Patient Instructions:   No discharge procedures on file.  Medications:  Reconciled Home Medications:      Medication List        START taking these medications      methocarbamoL 500 MG Tab  Commonly known as: Robaxin  Take 1 tablet (500 mg total) by mouth 3 (three) times daily. for 7 days     oxyCODONE 10 mg Tab immediate release tablet  Commonly known as: ROXICODONE  Take 1 tablet (10 mg total) by mouth every 8 (eight) hours as needed for Pain.            CONTINUE taking these medications      albuterol 90 mcg/actuation inhaler  Commonly known as: PROVENTIL/VENTOLIN HFA  Inhale 1-2 puffs into the lungs every 6 (six) hours as needed for Wheezing (cough). Rescue     divalproex  MG 24 hr tablet  Commonly known as: DEPAKOTE ER  Take 1 tablet (250 mg total) by mouth every 12 (twelve) hours.     fenofibrate 160 MG Tab  Take 1 tablet by mouth once daily.     gabapentin 300 MG capsule  Commonly known as: NEURONTIN  Take 300 mg by mouth.     icosapent ethyL 1 gram Cap  Commonly known as: VASCEPA  Take 2,000 mg by mouth.     nicotine 14 mg/24 hr  Commonly known as: NICODERM CQ  1 patch.     omega-3 acid ethyl esters 1 gram capsule  Commonly known as: LOVAZA  Take 1 g by mouth once daily.     rizatriptan 10 MG disintegrating tablet  Commonly known as: MAXALT-MLT  Take 1 tablet (10 mg total) by mouth 2 (two) times daily as needed for Migraine. May repeat in 2 hours if needed            STOP taking these medications      amitriptyline 100 MG tablet  Commonly known as: CHARLENE Gan PA-C  General Surgery  [facility] [address] [facility]

## 2025-04-14 NOTE — HOSPITAL COURSE
Thalia Ceballos is a 35-year-old female admitted with acute appendicitis s/p lap appendectomy 4/13 with Dr. Winn.  Tolerated diet.  Pain controlled.  Ambulating.  Patient verbalized understanding of all discharge instructions, new prescription usage, signs and symptoms to be aware of for immediate evaluation.  Follow up acute care surgery TeleMed and PCP.

## 2025-04-14 NOTE — PROGRESS NOTES
"   Acute Care Surgery   Progress Note  Admit Date: 4/13/2025  HD#1  POD#1 Day Post-Op    Subjective:   Interval history:  POD1 Lap Appy  Overnight had worsening MSK pain in LLQ, having significant muscle spasms  HDS, AF  Started on IV Robaxin, gabapentin and heat packs  Chivo reg diet ON  No BM's, PF  Pain is improved this AM, but still significant     Home Meds:  Current Outpatient Medications   Medication Instructions    albuterol (PROVENTIL/VENTOLIN HFA) 90 mcg/actuation inhaler 1-2 puffs, Inhalation, Every 6 hours PRN, Rescue    amitriptyline (ELAVIL) 100 mg, Nightly    divalproex ER (DEPAKOTE ER) 250 mg, Oral, Every 12 hours    fenofibrate 160 MG Tab 1 tablet, Daily    gabapentin (NEURONTIN) 300 mg    icosapent ethyL (VASCEPA) 2,000 mg    nicotine (NICODERM CQ) 14 mg/24 hr 1 patch    omega-3 acid ethyl esters (LOVAZA) 1 g, Daily    rizatriptan (MAXALT-MLT) 10 mg, Oral, 2 times daily PRN, May repeat in 2 hours if needed      Scheduled Meds:   divalproex  250 mg Oral Q12H    gabapentin  300 mg Oral TID    methocarbamol injection  1,000 mg Intravenous Q8H    nicotine  1 patch Transdermal Daily     Continuous Infusions:  PRN Meds:  Current Facility-Administered Medications:     acetaminophen, 650 mg, Oral, Q8H PRN    acetaminophen, 650 mg, Oral, Q4H PRN    dextrose 50%, 12.5 g, Intravenous, PRN    LIDOcaine HCL 10 mg/ml (1%), 1 mL, Intradermal, Once PRN    melatonin, 6 mg, Oral, Nightly PRN    ondansetron, 8 mg, Oral, Q8H PRN    oxyCODONE, 5 mg, Oral, Q4H PRN    oxyCODONE, 10 mg, Oral, Q4H PRN    sodium chloride 0.9%, 10 mL, Intravenous, PRN     Objective:     VITAL SIGNS: 24 HR MIN & MAX LAST   Temp  Min: 98 °F (36.7 °C)  Max: 98.3 °F (36.8 °C)  98.2 °F (36.8 °C)   BP  Min: 97/62  Max: 157/95  97/62    Pulse  Min: 70  Max: 97  86    Resp  Min: 11  Max: 21  18    SpO2  Min: 93 %  Max: 99 %  96 %      HT: 5' 4" (162.6 cm)  WT: 84.4 kg (186 lb)  BMI: 31.9     Intake/output:  Intake/Output - Last 3 Shifts       None " "         No intake or output data in the 24 hours ending 04/14/25 0616      Lines/drains/airway:       Peripheral IV - Single Lumen 04/13/25 0909 20 G Left Antecubital (Active)   Site Assessment Clean;Dry;Intact 04/14/25 0400   Line Securement Device Secured with sutureless device 04/13/25 1536   Extremity Assessment Distal to IV No abnormal discoloration;No redness;No swelling 04/13/25 1442   Line Status Saline locked 04/14/25 0400   Dressing Status Clean;Dry;Intact 04/14/25 0400   Dressing Intervention Integrity maintained 04/14/25 0400   Dressing Change Due 04/17/25 04/13/25 1217   Site Change Due 04/17/25 04/13/25 1217   Reason Not Rotated Not due 04/13/25 1217   Number of days: 0       Physical examination:  Gen: NAD, AAOx3, answering questions appropriately  HEENT: NC  CV: RR  Resp: NWOB  Abd: TTP in LLQ near incision, ND  Ext: moving all extremities spontaneously and purposefully  Neuro: CN II-XII grossly intact  Skin/wounds: incisions c/d/i    Labs:  Renal:  Recent Labs     04/13/25  0956   BUN 14.4   CREATININE 0.78     No results for input(s): "LACTIC" in the last 72 hours.  FENGI:  Recent Labs     04/13/25  0956      K 4.5   *   CO2 23   CALCIUM 9.6   ALBUMIN 3.8   BILITOT 0.1   AST 14   ALKPHOS 56   ALT 21     Heme:  Recent Labs     04/13/25  0908   HGB 14.0   HCT 41.8        ID:  Recent Labs     04/13/25  0908   WBC 15.93*     CBG:  Recent Labs     04/13/25  0956   GLUCOSE 116*      Cardiovascular:  No results for input(s): "TROPONINI", "CKTOTAL", "CKMB", "BNP" in the last 168 hours.  ABG:  No results for input(s): "PH", "PO2", "PCO2", "HCO3", "BE" in the last 168 hours.   I have reviewed all pertinent lab results within the past 24 hours.    Imaging:  CT Abdomen Pelvis  Without Contrast   Final Result      1. Appendiceal diameter upper limits of normal with suspected minimal inflammatory strandings around the tip of the appendix.  Findings are equivocal for early acute appendicitis " on this noncontrast exam.  Clinical correlation is recommended.      2. Details of other findings above..      Findings were discussed with Dr. Mcdermott April 13, 2025 at 1000 hours.         Electronically signed by: King Saeed   Date:    04/13/2025   Time:    10:01         I have reviewed all pertinent imaging results/findings within the past 24 hours.    Micro/Path/Other:  Microbiology Results (last 7 days)       Procedure Component Value Units Date/Time    Blood Culture #1 **CANNOT BE ORDERED STAT** [[id number]] Collected: 04/13/25 1013    Order Status: Resulted Specimen: Blood Updated: 04/13/25 1017    Blood Culture #1 **CANNOT BE ORDERED STAT** [[id number]] Collected: 04/13/25 1013    Order Status: Resulted Specimen: Blood Updated: 04/13/25 1016           Specimens (From admission, onward)       Start     Ordered    04/13/25 1404  Specimen to Pathology  RELEASE UPON ORDERING        References:    Click here for ordering Quick Tip   Question:  Release to patient  Answer:  Immediate    04/13/25 1404                   Pathology Results  (Last 365 days)      None             Assessment & Plan:   35F who is POD1 from laparoscopic appendectomy, began having significant MSK pain in her LL abdominal wall.     - C/w current MMPC  - C/w reg diet  - Encouraged ambulation  - FU AM CBC  - Poss DC this afternoon if pain is better controlled    Virgil Hutton MD  [employer] General Surgery PGY1    Follow Up/ Referrals/ Discharge Instructions:     - Follow up: [facility] - Address [address] [place] [address] [address]. Phone - [phone]

## 2025-04-15 VITALS
OXYGEN SATURATION: 99 % | HEIGHT: 64 IN | TEMPERATURE: 98 F | WEIGHT: 186 LBS | SYSTOLIC BLOOD PRESSURE: 115 MMHG | DIASTOLIC BLOOD PRESSURE: 74 MMHG | HEART RATE: 79 BPM | RESPIRATION RATE: 20 BRPM | BODY MASS INDEX: 31.76 KG/M2

## 2025-04-15 LAB
ERYTHROCYTE [DISTWIDTH] IN BLOOD BY AUTOMATED COUNT: 13.2 % (ref 11.5–17)
HCT VFR BLD AUTO: 41.3 % (ref 37–47)
HGB BLD-MCNC: 12.8 G/DL (ref 12–16)
MCH RBC QN AUTO: 30.8 PG (ref 27–31)
MCHC RBC AUTO-ENTMCNC: 31 G/DL (ref 33–36)
MCV RBC AUTO: 99.5 FL (ref 80–94)
NRBC BLD AUTO-RTO: 0 %
PLATELET # BLD AUTO: 335 X10(3)/MCL (ref 130–400)
PMV BLD AUTO: 9 FL (ref 7.4–10.4)
PSYCHE PATHOLOGY RESULT: NORMAL
RBC # BLD AUTO: 4.15 X10(6)/MCL (ref 4.2–5.4)
WBC # BLD AUTO: 16.1 X10(3)/MCL (ref 4.5–11.5)

## 2025-04-15 PROCEDURE — 25000003 PHARM REV CODE 250: Performed by: STUDENT IN AN ORGANIZED HEALTH CARE EDUCATION/TRAINING PROGRAM

## 2025-04-15 PROCEDURE — 63600175 PHARM REV CODE 636 W HCPCS

## 2025-04-15 PROCEDURE — G0378 HOSPITAL OBSERVATION PER HR: HCPCS

## 2025-04-15 PROCEDURE — 96376 TX/PRO/DX INJ SAME DRUG ADON: CPT

## 2025-04-15 PROCEDURE — S4991 NICOTINE PATCH NONLEGEND: HCPCS | Performed by: STUDENT IN AN ORGANIZED HEALTH CARE EDUCATION/TRAINING PROGRAM

## 2025-04-15 PROCEDURE — 36415 COLL VENOUS BLD VENIPUNCTURE: CPT

## 2025-04-15 PROCEDURE — 25000003 PHARM REV CODE 250

## 2025-04-15 PROCEDURE — 85027 COMPLETE CBC AUTOMATED: CPT

## 2025-04-15 PROCEDURE — 94799 UNLISTED PULMONARY SVC/PX: CPT

## 2025-04-15 PROCEDURE — 99900035 HC TECH TIME PER 15 MIN (STAT)

## 2025-04-15 RX ORDER — AMOXICILLIN AND CLAVULANATE POTASSIUM 875; 125 MG/1; MG/1
1 TABLET, FILM COATED ORAL EVERY 12 HOURS
Qty: 8 TABLET | Refills: 0 | Status: SHIPPED | OUTPATIENT
Start: 2025-04-15 | End: 2025-04-19

## 2025-04-15 RX ADMIN — NICOTINE 1 PATCH: 7 PATCH, EXTENDED RELEASE TRANSDERMAL at 08:04

## 2025-04-15 RX ADMIN — OXYCODONE HYDROCHLORIDE 10 MG: 10 TABLET ORAL at 04:04

## 2025-04-15 RX ADMIN — DIVALPROEX SODIUM 250 MG: 250 TABLET, DELAYED RELEASE ORAL at 08:04

## 2025-04-15 RX ADMIN — METHOCARBAMOL 1000 MG: 100 INJECTION INTRAMUSCULAR; INTRAVENOUS at 05:04

## 2025-04-15 RX ADMIN — GABAPENTIN 300 MG: 300 CAPSULE ORAL at 08:04

## 2025-04-15 NOTE — PLAN OF CARE
Problem: Infection  Goal: Absence of Infection Signs and Symptoms  Outcome: Adequate for Care Transition     Problem: Adult Inpatient Plan of Care  Goal: Plan of Care Review  Outcome: Adequate for Care Transition  Goal: Patient-Specific Goal (Individualized)  Outcome: Adequate for Care Transition  Goal: Absence of Hospital-Acquired Illness or Injury  Outcome: Adequate for Care Transition  Goal: Optimal Comfort and Wellbeing  Outcome: Adequate for Care Transition  Goal: Readiness for Transition of Care  Outcome: Adequate for Care Transition     Problem: Wound  Goal: Optimal Coping  Outcome: Adequate for Care Transition  Goal: Optimal Functional Ability  Outcome: Adequate for Care Transition  Goal: Absence of Infection Signs and Symptoms  Outcome: Adequate for Care Transition  Goal: Improved Oral Intake  Outcome: Adequate for Care Transition  Goal: Optimal Pain Control and Function  Outcome: Adequate for Care Transition  Goal: Skin Health and Integrity  Outcome: Adequate for Care Transition  Goal: Optimal Wound Healing  Outcome: Adequate for Care Transition

## 2025-04-15 NOTE — PLAN OF CARE
Problem: Infection  Goal: Absence of Infection Signs and Symptoms  Outcome: Progressing     Problem: Adult Inpatient Plan of Care  Goal: Absence of Hospital-Acquired Illness or Injury  Outcome: Progressing     Problem: Adult Inpatient Plan of Care  Goal: Optimal Comfort and Wellbeing  Outcome: Progressing     Problem: Wound  Goal: Optimal Wound Healing  Outcome: Progressing     Problem: Wound  Goal: Optimal Pain Control and Function  Outcome: Progressing

## 2025-04-15 NOTE — PLAN OF CARE
04/15/25 1112   Final Note   Assessment Type Final Discharge Note   Anticipated Discharge Disposition Home   Post-Acute Status   Coverage Home Self   Discharge Delays None known at this time

## 2025-04-15 NOTE — PLAN OF CARE
04/15/25 1000   Discharge Assessment   Assessment Type Discharge Planning Assessment   Confirmed/corrected address, phone number and insurance Yes   Confirmed Demographics Correct on Facesheet   Source of Information patient   When was your last doctors appointment?   ([facility])   Communicated TIP with patient/caregiver Date not available/Unable to determine   Reason For Admission Acute appendicitis with localized  Peritonitis   People in Home spouse   Do you expect to return to your current living situation? Yes   Do you have help at home or someone to help you manage your care at home? No   Prior to hospitilization cognitive status: Unable to Assess   Current cognitive status: Alert/Oriented   Walking or Climbing Stairs Difficulty no   Dressing/Bathing Difficulty no   Home Layout Able to live on 1st floor   Equipment Currently Used at Home none   Readmission within 30 days? No   Patient currently being followed by outpatient case management? No   Do you currently have service(s) that help you manage your care at home? No   Do you take prescription medications? Yes   Do you have prescription coverage? Yes   Coverage Medicaid   Do you have any problems affording any of your prescribed medications? No   Is the patient taking medications as prescribed? yes   Who is going to help you get home at discharge? Spouse Yimi Ceballos   How do you get to doctors appointments? family or friend will provide;car, drives self   Are you on dialysis? No   Do you take coumadin? No   Discharge Plan A Home;Home with family   Discharge Plan B Home   DME Needed Upon Discharge  none   Discharge Plan discussed with: Patient   Transition of Care Barriers None   Physical Activity   On average, how many days per week do you engage in moderate to strenuous exercise (like a brisk walk)? 0 days   On average, how many minutes do you engage in exercise at this level? 0 min   Financial Resource Strain   How hard is it for you to pay for  the very basics like food, housing, medical care, and heating? Not hard   Housing Stability   In the last 12 months, was there a time when you were not able to pay the mortgage or rent on time? N   At any time in the past 12 months, were you homeless or living in a shelter (including now)? N   Transportation Needs   In the past 12 months, has lack of transportation kept you from medical appointments or from getting medications? no   In the past 12 months, has lack of transportation kept you from meetings, work, or from getting things needed for daily living? No   Food Insecurity   Within the past 12 months, you worried that your food would run out before you got the money to buy more. Never true   Within the past 12 months, the food you bought just didn't last and you didn't have money to get more. Never true   Stress   Do you feel stress - tense, restless, nervous, or anxious, or unable to sleep at night because your mind is troubled all the time - these days? Not at all   Social Isolation   How often do you feel lonely or isolated from those around you?  Never   Alcohol Use   Q1: How often do you have a drink containing alcohol? Never   Q2: How many drinks containing alcohol do you have on a typical day when you are drinking? None   Q3: How often do you have six or more drinks on one occasion? Never   Tapdaqities   In the past 12 months has the electric, gas, oil, or water company threatened to shut off services in your home? No   Health Literacy   How often do you need to have someone help you when you read instructions, pamphlets, or other written material from your doctor or pharmacy? Never   OTHER   Name(s) of People in Home Spouse Yimi Ceballos     Pharmacy is [facility] in [place]

## 2025-04-15 NOTE — DISCHARGE SUMMARY
[facility] [address]  General Surgery  Discharge Summary      Patient Name: Thalia Ceballos  MRN: [id number]  Admission Date: 4/13/2025  Hospital Length of Stay: 1 days  Discharge Date and Time: 04/15/2025 8:20 AM  Attending Physician: Juan Jones MD   Discharging Provider: Maura Gan PA-C  Primary Care Provider: Merline Kuo FNP    HPI:   No notes on file    Procedure(s) (LRB):  APPENDECTOMY, LAPAROSCOPIC (N/A)      Indwelling Lines/Drains at time of discharge:   Lines/Drains/Airways       None                 Hospital Course: Thalia Ceballos is a 35-year-old female admitted with acute appendicitis s/p lap appendectomy 4/13 with Dr. Winn.  Tolerated diet.  Pain controlled.  Ambulating.  Patient verbalized understanding of all discharge instructions, new prescription usage, signs and symptoms to be aware of for immediate evaluation.  Follow up acute care surgery TeleMed and PCP.    Goals of Care Treatment Preferences:  Code Status: Full Code      Consults:     Significant Diagnostic Studies: N/A    Pending Diagnostic Studies:       Procedure Component Value Units Date/Time    Specimen to Pathology [[id number]] Collected: 04/13/25 1403    Order Status: Sent Lab Status: In process Updated: 04/14/25 0839    Specimen: Tissue from Appendix           Final Active Diagnoses:    Diagnosis Date Noted POA    PRINCIPAL PROBLEM:  Acute appendicitis with localized peritonitis, without perforation, abscess, or gangrene [K35.30] 04/14/2025 Yes      Problems Resolved During this Admission:      Discharged Condition: good    Disposition: Home or Self Care    Follow Up:   Follow-up Information       Clinic, Acute Care Surgery Follow up.    Contact information:  [address]  [address]  [facility] [address]  [phone]               Merline Kuo FNP Follow up in 1 week(s).    Specialties: Hospitalist, Family Medicine  Contact information:  [address]  [address]  [facility]  [address]  [phone]                           Patient Instructions:   No discharge procedures on file.  Medications:  Reconciled Home Medications:      Medication List        START taking these medications      amoxicillin-clavulanate 875-125mg 875-125 mg per tablet  Commonly known as: AUGMENTIN  Take 1 tablet by mouth every 12 (twelve) hours. for 4 days     methocarbamoL 500 MG Tab  Commonly known as: Robaxin  Take 1 tablet (500 mg total) by mouth 3 (three) times daily. for 7 days     oxyCODONE 10 mg Tab immediate release tablet  Commonly known as: ROXICODONE  Take 1 tablet (10 mg total) by mouth every 8 (eight) hours as needed for Pain.            CONTINUE taking these medications      albuterol 90 mcg/actuation inhaler  Commonly known as: PROVENTIL/VENTOLIN HFA  Inhale 1-2 puffs into the lungs every 6 (six) hours as needed for Wheezing (cough). Rescue     divalproex  MG 24 hr tablet  Commonly known as: DEPAKOTE ER  Take 1 tablet (250 mg total) by mouth every 12 (twelve) hours.     fenofibrate 160 MG Tab  Take 1 tablet by mouth once daily.     gabapentin 300 MG capsule  Commonly known as: NEURONTIN  Take 300 mg by mouth.     icosapent ethyL 1 gram Cap  Commonly known as: VASCEPA  Take 2,000 mg by mouth.     nicotine 14 mg/24 hr  Commonly known as: NICODERM CQ  1 patch.     omega-3 acid ethyl esters 1 gram capsule  Commonly known as: LOVAZA  Take 1 g by mouth once daily.     rizatriptan 10 MG disintegrating tablet  Commonly known as: MAXALT-MLT  Take 1 tablet (10 mg total) by mouth 2 (two) times daily as needed for Migraine. May repeat in 2 hours if needed            STOP taking these medications      amitriptyline 100 MG tablet  Commonly known as: CHARLENE Gan PA-C  General Surgery  [facility]

## 2025-04-18 LAB
BACTERIA BLD CULT: NORMAL
BACTERIA BLD CULT: NORMAL

## 2025-04-18 NOTE — OP NOTE
APPENDECTOMY, LAPAROSCOPIC  Procedure Note    Thalia Ceballos  4/13/2025      Pre-op Diagnosis: Acute appendicitis with localized peritonitis, without perforation, abscess, or gangrene [K35.30]       Post-op Diagnosis: same    Procedure(s):  APPENDECTOMY, LAPAROSCOPIC    Surgeon(s):  Hugh Winn MD Trinh, Sophia Mai-Tram, MD    Anesthesia: General    Staff:   Circulator: Sury Salcedo RN; Yasmine Barrera RN  Scrub Person: Corinne Herndon    Estimated Blood Loss: minimal               Specimens: appendix      Drains: None    Operative Technique:  Risks and benefits were discussed with patient and family at bedside, informed consent signed.  Patient was taken to the OR and placed supine, endotracheal intubation was successful.  The abdomen was then prepped and draped in sterile fashion.  A time out was completed to confirm correct patient, procedure, and all staff was in agreement.      A stab incision was made in the left upper quadrant and a Veress needle was introduced into the abdomen.  Insufflation was successful and without issue.  A janusz-umbilical incision was then made with the scapel and a 5mm optical port and the camera was introduced into the abdomen without issue.  An additional 5mm trocar port was placed supra-pubic and a 12mm trocar port was placed in the left lower quadrant under direct visualization.  Attention was then directed to the right lower quadrant of the abdomen.  The appendix appeared to have acute inflammatory changes.  The appendix was carefully freed from surrounding adhesions using blunt dissection.  The appendix was then retracted superiorly and the appendix was dissected near its base to the cecum.  Once an adequate janusz-appendiceal dissection was performed, the appendix was transected with an Endo-DANDRE stapler.  The appendiceal mesentary was then transected with an Endo-DANDRE vascular load.  The appendix was then removed when an endo-catch bag through the left lower quadrant trocar  port.  Attention was re-directed to the staple lines with no active bleeding noted.   The 12mm left lower quadrant port abdominal fascia was closed with a 0 vicryl stitch.  All ports were removed under direct visualization with no active bleeding noted. Incisions were closed with a 4-0 vicryl stitch.  The wounds were cleaned and dried and dermabond was applied.  The patient tolerated the procedure well and was transported to recovery room in good condition.       SURGEON:  Hugh Winn MD    Date: 4/13/2025  Time: 02:30 PM

## 2025-04-21 ENCOUNTER — HOSPITAL ENCOUNTER (EMERGENCY)
Facility: HOSPITAL | Age: 35
Discharge: HOME | End: 2025-04-21
Attending: EMERGENCY MEDICINE
Payer: MEDICAID

## 2025-04-21 VITALS
SYSTOLIC BLOOD PRESSURE: 117 MMHG | HEIGHT: 64 IN | WEIGHT: 186 LBS | BODY MASS INDEX: 31.76 KG/M2 | OXYGEN SATURATION: 97 % | DIASTOLIC BLOOD PRESSURE: 79 MMHG | TEMPERATURE: 99 F | HEART RATE: 86 BPM | RESPIRATION RATE: 18 BRPM

## 2025-04-21 DIAGNOSIS — Z90.49 STATUS POST APPENDECTOMY: ICD-10-CM

## 2025-04-21 DIAGNOSIS — R10.9 ABDOMINAL PAIN, UNSPECIFIED ABDOMINAL LOCATION: Primary | ICD-10-CM

## 2025-04-21 LAB
ALBUMIN SERPL-MCNC: 4.4 G/DL (ref 3.5–5)
ALBUMIN/GLOB SERPL: 1.3 RATIO (ref 1.1–2)
ALP SERPL-CCNC: 61 UNIT/L (ref 40–150)
ALT SERPL-CCNC: 18 UNIT/L (ref 0–55)
ANION GAP SERPL CALC-SCNC: 8 MEQ/L
AST SERPL-CCNC: 14 UNIT/L (ref 11–45)
B-HCG UR QL: NEGATIVE
BACTERIA #/AREA URNS AUTO: ABNORMAL /HPF
BASOPHILS # BLD AUTO: 0.09 X10(3)/MCL
BASOPHILS NFR BLD AUTO: 0.6 %
BILIRUB SERPL-MCNC: 0.3 MG/DL
BILIRUB UR QL STRIP.AUTO: NEGATIVE
BUN SERPL-MCNC: 12.6 MG/DL (ref 7–18.7)
CALCIUM SERPL-MCNC: 9.8 MG/DL (ref 8.4–10.2)
CHLORIDE SERPL-SCNC: 110 MMOL/L (ref 98–107)
CLARITY UR: CLEAR
CO2 SERPL-SCNC: 20 MMOL/L (ref 22–29)
COLOR UR AUTO: ABNORMAL
CREAT SERPL-MCNC: 0.76 MG/DL (ref 0.55–1.02)
CREAT/UREA NIT SERPL: 17
EOSINOPHIL # BLD AUTO: 0.83 X10(3)/MCL (ref 0–0.9)
EOSINOPHIL NFR BLD AUTO: 5.1 %
ERYTHROCYTE [DISTWIDTH] IN BLOOD BY AUTOMATED COUNT: 12.6 % (ref 11.5–17)
GFR SERPLBLD CREATININE-BSD FMLA CKD-EPI: >60 ML/MIN/1.73/M2
GLOBULIN SER-MCNC: 3.5 GM/DL (ref 2.4–3.5)
GLUCOSE SERPL-MCNC: 82 MG/DL (ref 74–100)
GLUCOSE UR QL STRIP: NORMAL
HCT VFR BLD AUTO: 44.4 % (ref 37–47)
HGB BLD-MCNC: 14.7 G/DL (ref 12–16)
HGB UR QL STRIP: NEGATIVE
IMM GRANULOCYTES # BLD AUTO: 0.1 X10(3)/MCL (ref 0–0.04)
IMM GRANULOCYTES NFR BLD AUTO: 0.6 %
KETONES UR QL STRIP: NEGATIVE
LACTATE SERPL-SCNC: 0.7 MMOL/L (ref 0.5–2.2)
LACTATE SERPL-SCNC: 2.1 MMOL/L (ref 0.5–2.2)
LEUKOCYTE ESTERASE UR QL STRIP: NEGATIVE
LYMPHOCYTES # BLD AUTO: 3.97 X10(3)/MCL (ref 0.6–4.6)
LYMPHOCYTES NFR BLD AUTO: 24.4 %
MCH RBC QN AUTO: 31.8 PG (ref 27–31)
MCHC RBC AUTO-ENTMCNC: 33.1 G/DL (ref 33–36)
MCV RBC AUTO: 96.1 FL (ref 80–94)
MONOCYTES # BLD AUTO: 1.06 X10(3)/MCL (ref 0.1–1.3)
MONOCYTES NFR BLD AUTO: 6.5 %
NEUTROPHILS # BLD AUTO: 10.19 X10(3)/MCL (ref 2.1–9.2)
NEUTROPHILS NFR BLD AUTO: 62.8 %
NITRITE UR QL STRIP: NEGATIVE
NRBC BLD AUTO-RTO: 0 %
PH UR STRIP: 6 [PH]
PLATELET # BLD AUTO: 360 X10(3)/MCL (ref 130–400)
PMV BLD AUTO: 8.9 FL (ref 7.4–10.4)
POTASSIUM SERPL-SCNC: 4.4 MMOL/L (ref 3.5–5.1)
PROT SERPL-MCNC: 7.9 GM/DL (ref 6.4–8.3)
PROT UR QL STRIP: NEGATIVE
RBC # BLD AUTO: 4.62 X10(6)/MCL (ref 4.2–5.4)
RBC #/AREA URNS AUTO: ABNORMAL /HPF
SODIUM SERPL-SCNC: 138 MMOL/L (ref 136–145)
SP GR UR STRIP.AUTO: 1.02 (ref 1–1.03)
SQUAMOUS #/AREA URNS LPF: ABNORMAL /HPF
UROBILINOGEN UR STRIP-ACNC: NORMAL
WBC # BLD AUTO: 16.24 X10(3)/MCL (ref 4.5–11.5)
WBC #/AREA URNS AUTO: ABNORMAL /HPF

## 2025-04-21 PROCEDURE — 85025 COMPLETE CBC W/AUTO DIFF WBC: CPT

## 2025-04-21 PROCEDURE — 81001 URINALYSIS AUTO W/SCOPE: CPT

## 2025-04-21 PROCEDURE — 87040 BLOOD CULTURE FOR BACTERIA: CPT

## 2025-04-21 PROCEDURE — 80053 COMPREHEN METABOLIC PANEL: CPT

## 2025-04-21 PROCEDURE — 81025 URINE PREGNANCY TEST: CPT

## 2025-04-21 PROCEDURE — 99024 POSTOP FOLLOW-UP VISIT: CPT | Mod: ,,, | Performed by: SURGERY

## 2025-04-21 PROCEDURE — 83605 ASSAY OF LACTIC ACID: CPT

## 2025-04-21 PROCEDURE — 99284 EMERGENCY DEPT VISIT MOD MDM: CPT | Mod: 25

## 2025-04-21 NOTE — FIRST PROVIDER EVALUATION
Medical screening examination initiated.  I have conducted a focused provider triage encounter, findings are as follows:    Brief history of present illness:  arrived to ED due to abdominal pain, abdominal distention, and drainage from sites. Recent appendectomy performed on 04/13/25. TMAX 100.3. Took Tylenol prior to arrival.     Vitals:    04/21/25 1312   BP: 132/85   BP Location: Left arm   Pulse: 103   Resp: 20   Temp: 98.8 °F (37.1 °C)   TempSrc: Oral   SpO2: 97%       Pertinent physical exam:  awake, alert, has non-labored breathing, ambulatory into triage    Brief workup plan:  labs    Preliminary workup initiated; this workup will be continued and followed by the physician or advanced practice provider that is assigned to the patient when roomed.

## 2025-04-22 NOTE — ED PROVIDER NOTES
Encounter Date: 4/21/2025    SCRIBE #1 NOTE: I, Nikki Trujillo, am scribing for, and in the presence of,  Elton Grider MD. I have scribed the following portions of the note - Other sections scribed: HPI, ROS, PE.       History     Chief Complaint   Patient presents with    Abdominal Pain     Pt s/p appy on 4/13/25. Pt co having pain with some drainage from surgical areas. Fever at home with distention     Patient is a 35-year-old female with a history of HLD presenting to the ED with c/o abdominal pain. The pt is s/p appendectomy with Dr. Winn on 4/13/25. The pt states she noticed drainage on her shirt from her incision sites earlier today when she was changing. She is also reporting right lower quadrant abdominal pain, nausea, and fever with a maximum temperature of 100.3°. The pt denies any vomiting.    The history is provided by the patient. No  was used.     Review of patient's allergies indicates:   Allergen Reactions    Bentyl [dicyclomine] Swelling     Other reaction(s): facial swelling w/resp. distress  Other reaction(s): facial swelling w/resp. distress      Fluoxetine Swelling    Iodine Swelling    Taxol [paclitaxel]     Shellfish containing products Hives and Rash     Other reaction(s): swelling of hands and face     Past Medical History:   Diagnosis Date    Anemia     High cholesterol     Migraines     Mitral valve prolapse      Past Surgical History:   Procedure Laterality Date    CHOLECYSTECTOMY      In 2011    GALLBLADDER SURGERY  2011    KIDNEY STONE SURGERY  2013    had stent put in to drain and then removed.    LAPAROSCOPIC APPENDECTOMY N/A 4/13/2025    Procedure: APPENDECTOMY, LAPAROSCOPIC;  Surgeon: Hugh Winn MD;  Location: [facility];  Service: General;  Laterality: N/A;    TUBAL LIGATION  12/2015    tubes tied  2015     Family History   Problem Relation Name Age of Onset    Diabetes Mother Zabrina Stauffer     Diabetes Maternal Grandmother Jasmina      Social  History[1]  Review of Systems   Constitutional:  Negative for chills and fever.   Respiratory:  Negative for cough and shortness of breath.    Cardiovascular:  Negative for chest pain.   Gastrointestinal:  Positive for abdominal pain and nausea. Negative for vomiting.   Musculoskeletal:  Negative for myalgias.   All other systems reviewed and are negative.      Physical Exam     Initial Vitals [04/21/25 1312]   BP Pulse Resp Temp SpO2   132/85 103 20 98.8 °F (37.1 °C) 97 %      MAP       --         Physical Exam    Nursing note and vitals reviewed.  Constitutional: She appears well-developed and well-nourished. No distress.   Serosanguinous dried fluid on shirt.   HENT:   Head: Normocephalic and atraumatic.   Eyes: Conjunctivae are normal.   Cardiovascular:  Normal rate and intact distal pulses.           Pulmonary/Chest: No respiratory distress. She has no rhonchi.   Abdominal: Abdomen is soft. Bowel sounds are normal. There is no abdominal tenderness.   No expressible discharge from surgical incision sites.   Tenderness to the right lower quadrant with some guarding.   There is no rebound and no guarding.   Musculoskeletal:         General: No edema.     Neurological: She is alert. She has normal strength.   Skin: Skin is warm and dry.   Psychiatric: She has a normal mood and affect.         ED Course   Procedures  Labs Reviewed   COMPREHENSIVE METABOLIC PANEL - Abnormal       Result Value    Sodium 138      Potassium 4.4      Chloride 110 (*)     CO2 20 (*)     Glucose 82      Blood Urea Nitrogen 12.6      Creatinine 0.76      Calcium 9.8      Protein Total 7.9      Albumin 4.4      Globulin 3.5      Albumin/Globulin Ratio 1.3      Bilirubin Total 0.3      ALP 61      ALT 18      AST 14      eGFR >60      Anion Gap 8.0      BUN/Creatinine Ratio 17     URINALYSIS, REFLEX TO URINE CULTURE - Abnormal    Color, UA Light-Yellow      Appearance, UA Clear      Specific Gravity, UA 1.024      pH, UA 6.0      Protein, UA  Negative      Glucose, UA Normal      Ketones, UA Negative      Blood, UA Negative      Bilirubin, UA Negative      Urobilinogen, UA Normal      Nitrites, UA Negative      Leukocyte Esterase, UA Negative      RBC, UA 0-5      WBC, UA 0-5      Bacteria, UA None Seen      Squamous Epithelial Cells, UA Few (*)    CBC WITH DIFFERENTIAL - Abnormal    WBC 16.24 (*)     RBC 4.62      Hgb 14.7      Hct 44.4      MCV 96.1 (*)     MCH 31.8 (*)     MCHC 33.1      RDW 12.6      Platelet 360      MPV 8.9      Neut % 62.8      Lymph % 24.4      Mono % 6.5      Eos % 5.1      Basophil % 0.6      Imm Grans % 0.6      Neut # 10.19 (*)     Lymph # 3.97      Mono # 1.06      Eos # 0.83      Baso # 0.09      Imm Gran # 0.10 (*)     NRBC% 0.0     PREGNANCY TEST, URINE RAPID - Normal    hCG Qualitative, Urine Negative     LACTIC ACID, PLASMA - Normal    Lactic Acid Level 2.1     LACTIC ACID, PLASMA - Normal    Lactic Acid Level 0.7     BLOOD CULTURE [facility]   BLOOD CULTURE [facility]   CBC W/ AUTO DIFFERENTIAL    Narrative:     The following orders were created for panel order CBC W/ AUTO DIFFERENTIAL.  Procedure                               Abnormality         Status                     ---------                               -----------         ------                     CBC with Differential[[id number]]       Abnormal            Final result                 Please view results for these tests on the individual orders.          Imaging Results              CT Abdomen Pelvis  Without Contrast (In process)  Result time 04/21/25 19:44:38                     Medications - No data to display  Medical Decision Making  Differential diagnosis include but are not limited to: stitch abscess, post-operative pain, and post-operative infection.    Amount and/or Complexity of Data Reviewed  Labs: ordered. Decision-making details documented in ED Course.  Radiology: ordered and independent interpretation performed. Decision-making details documented in ED  Course.            Scribe Attestation:   Scribe #1: I performed the above scribed service and the documentation accurately describes the services I performed. I attest to the accuracy of the note.    Attending Attestation:           Physician Attestation for Scribe:  Physician Attestation Statement for Scribe #1: I, Elton Grider MD, reviewed documentation, as scribed by Nikki Trujillo in my presence, and it is both accurate and complete.             ED Course as of 04/21/25 2241 Mon Apr 21, 2025 2032 General surgery was present in ED and evaluated the pt. [RB]   2239 Discussed with chela Freeman to discharge home from surgery perspective.  The CT scan is reassuring per the report that I have reviewed.  Patient strongly wants to go home. [LF]      ED Course User Index  [LF] Elton Grider MD  [RB] Nikki Trujillo          Patient states she only came in because she told her doctor about the drainage they told her to come in the emergency department.  Appears to be serosanguineous sudden in the shirt no exudate on exam does stitch abscess on exam.  Afebrile here.  Has a leukocytosis 16 say that has what she left.  She states she is followed by the hematologist's because she has a chronically elevated white blood cells states it has been going up and down.  Discussed case with General surgery                 Clinical Impression:  Final diagnoses:  [R10.9] Abdominal pain, unspecified abdominal location (Primary)  [Z90.49] Status post appendectomy          ED Disposition Condition    Discharge Good          ED Prescriptions    None       Follow-up Information       Follow up With Specialties Details Why Contact Info    Merline Kuo FNP Hospitalist, Family Medicine Schedule an appointment as soon as possible for a visit   [address]  [facility] [address]  [phone]      Hugh Winn MD General Surgery Schedule an appointment as soon as possible for a visit   [address]  [facility] [address]  [phone]                 [1]   Social History  Tobacco Use    Smoking status: Every Day     Current packs/day: 1.00     Types: Cigarettes    Smokeless tobacco: Never   Substance Use Topics    Alcohol use: Not Currently    Drug use: Never        Elton Grider MD  04/21/25 1576

## 2025-04-22 NOTE — CONSULTS
Acute Care Surgery   Consult    Patient Name: Thalia Ceballos  YOB: 1990  Date: 04/21/2025 8:48 PM  Date of Admission: 4/21/2025  HD#0  POD#* No surgery found *    PRESENTING HISTORY   Chief Complaint/Reason for Admission: <principal problem not specified>  History source(s): patient  History of Present Illness:  35-year-old female with history of anxiety depression migraines and mitral valve prolapse who is s/p laparoscopic appendectomy for acute non perforated appendicitis on 04/13.  Patient was discharged home with 4 days of antibiotics which she endorses completing.  She reported to the ED today at the direction of her primary care provider who she was already scheduled to see.  She endorses incisional pain at both upper and lower left sided incisions, though reports that overall this pain has significantly improved since surgery. Pain is exacerbated by lifting, sneezing, coughing, twisting. She endorses minimal serosanguineous drainage onto her clothes.  Reports fever of 100.3 today.  Denies nausea or vomiting changes in bowel habits.    Review of Systems:  12 point ROS negative except as stated in HPI    PAST HISTORY:   Past medical history:  Past Medical History:   Diagnosis Date    Anemia     High cholesterol     Migraines     Mitral valve prolapse        Past surgical history:  Past Surgical History:   Procedure Laterality Date    CHOLECYSTECTOMY      In 2011    GALLBLADDER SURGERY  2011    KIDNEY STONE SURGERY  2013    had stent put in to drain and then removed.    LAPAROSCOPIC APPENDECTOMY N/A 4/13/2025    Procedure: APPENDECTOMY, LAPAROSCOPIC;  Surgeon: Hugh Winn MD;  Location: [facility];  Service: General;  Laterality: N/A;    TUBAL LIGATION  12/2015    tubes tied  2015       Family history:  Family History   Problem Relation Name Age of Onset    Diabetes Mother Zabrina Stauffer     Diabetes Maternal Grandmother Jasmina        Social history:  Social History     Socioeconomic History     Marital status:    Tobacco Use    Smoking status: Every Day     Current packs/day: 1.00     Types: Cigarettes    Smokeless tobacco: Never   Substance and Sexual Activity    Alcohol use: Not Currently    Drug use: Never     Social Drivers of Health     Financial Resource Strain: Low Risk  (4/15/2025)    Overall Financial Resource Strain (CARDIA)     Difficulty of Paying Living Expenses: Not hard at all   Food Insecurity: No Food Insecurity (4/15/2025)    Hunger Vital Sign     Worried About Running Out of Food in the Last Year: Never true     Ran Out of Food in the Last Year: Never true   Transportation Needs: No Transportation Needs (4/15/2025)    PRAPARE - Transportation     Lack of Transportation (Medical): No     Lack of Transportation (Non-Medical): No   Physical Activity: Inactive (4/15/2025)    Exercise Vital Sign     Days of Exercise per Week: 0 days     Minutes of Exercise per Session: 0 min   Stress: No Stress Concern Present (4/15/2025)    [employer] [employer] - Occupational Stress Questionnaire     Feeling of Stress : Not at all   Housing Stability: Low Risk  (4/15/2025)    Housing Stability Vital Sign     Unable to Pay for Housing in the Last Year: No     Homeless in the Last Year: No     Tobacco Use History[1]   Social History     Substance and Sexual Activity   Alcohol Use Not Currently        MEDICATIONS & ALLERGIES:     No current facility-administered medications on file prior to encounter.     Current Outpatient Medications on File Prior to Encounter   Medication Sig    albuterol (PROVENTIL/VENTOLIN HFA) 90 mcg/actuation inhaler Inhale 1-2 puffs into the lungs every 6 (six) hours as needed for Wheezing (cough). Rescue    divalproex ER (DEPAKOTE ER) 250 MG 24 hr tablet Take 1 tablet (250 mg total) by mouth every 12 (twelve) hours.    fenofibrate 160 MG Tab Take 1 tablet by mouth once daily.    gabapentin (NEURONTIN) 300 MG capsule Take 300 mg by mouth.    icosapent ethyL  "(VASCEPA) 1 gram Cap Take 2,000 mg by mouth.    methocarbamoL (ROBAXIN) 500 MG Tab Take 1 tablet (500 mg total) by mouth 3 (three) times daily. for 7 days    nicotine (NICODERM CQ) 14 mg/24 hr 1 patch.    omega-3 acid ethyl esters (LOVAZA) 1 gram capsule Take 1 g by mouth once daily.    rizatriptan (MAXALT-MLT) 10 MG disintegrating tablet Take 1 tablet (10 mg total) by mouth 2 (two) times daily as needed for Migraine. May repeat in 2 hours if needed     Allergies:   Review of patient's allergies indicates:   Allergen Reactions    Bentyl [dicyclomine] Swelling     Other reaction(s): facial swelling w/resp. distress  Other reaction(s): facial swelling w/resp. distress      Fluoxetine Swelling    Iodine Swelling    Taxol [paclitaxel]     Shellfish containing products Hives and Rash     Other reaction(s): swelling of hands and face     Scheduled Meds:  Continuous Infusions:  PRN Meds:    OBJECTIVE:   Vital Signs:  VITAL SIGNS: 24 HR MIN & MAX LAST   Temp  Min: 98.8 °F (37.1 °C)  Max: 98.8 °F (37.1 °C)  98.8 °F (37.1 °C)   BP  Min: 127/75  Max: 144/93  (!) 144/93    Pulse  Min: 90  Max: 103  90    Resp  Min: 18  Max: 20  18    SpO2  Min: 97 %  Max: 99 %  99 %      HT: 5' 4" (162.6 cm)  WT: 84.4 kg (186 lb)  BMI: 31.9     Intake/output:  Intake/Output - Last 3 Shifts       None          No intake or output data in the 24 hours ending 04/21/25 2048      Physical Exam:  General: Well developed, well nourished, no acute distress  HEENT: Normocephalic, PERRL  CV: RR  Resp: NWOB  GI:  Abdomen is soft, minimally distended, janusz-incisional tenderness to palpation.  Incisions with minimal janusz incisional erythema, flaking Dermabond, no expressible drainage.  :  Deferred  MSK: No muscle atrophy, cyanosis, peripheral edema, moving all extremities spontaneously  Skin/wounds:  No rashes, ulcers, erythema  Neuro:  CNII-XII grossly intact, alert and oriented to person, place, and time    Labs:  Troponin:  No results for input(s): " ""TROPONINI" in the last 72 hours.  CBC:  Recent Labs     04/21/25  1355   WBC 16.24*   RBC 4.62   HGB 14.7   HCT 44.4      MCV 96.1*   MCH 31.8*   MCHC 33.1     CMP:  Recent Labs     04/21/25  1355   CALCIUM 9.8   ALBUMIN 4.4      K 4.4   CO2 20*   *   BUN 12.6   CREATININE 0.76   ALKPHOS 61   ALT 18   AST 14   BILITOT 0.3     Lactic Acid:  Recent Labs     04/21/25  1355 04/21/25  1544   LACTATE 2.1 0.7     ETOH:  No results for input(s): "ETHANOL" in the last 72 hours.   Urine Drug Screen:  No results for input(s): "COCAINE", "OPIATE", "BARBITURATE", "AMPHETAMINE", "FENTANYL", "CANNABINOIDS", "MDMA" in the last 72 hours.    Invalid input(s): "BENZODIAZEPINE", "PHENCYCLIDINE"   ABG:  No results for input(s): "PH", "PO2", "PCO2", "HCO3", "BE" in the last 168 hours.   I have reviewed all pertinent lab results within the past 24 hours.    Diagnostic Results:  Imaging Results              CT Abdomen Pelvis  Without Contrast (In process)  Result time 04/21/25 19:44:38                   I have reviewed all pertinent imaging results/findings within the past 24 hours.    ASSESSMENT & PLAN:    35-year-old female with above-noted past medical history s/p laparoscopic appendectomy for non perforated acute appendicitis on 04/13 presenting to the ED with abdominal pain and minimal serosanguineous drainage from incisions.  Afebrile while in the ED, hemodynamically stable.  Notable leukocytosis of 16.  No expressible drainage on physical exam however tender in left lower quadrant and left upper quadrant at incision sites.    Case discussed between ED and general surgery staff.  CT scans reviewed without any acute findings.  Patient wishing to be discharged home at this time.  Return precautions provided.  Plan for follow-up virtually with surgery clinic on 04/29.  Dispo per ED           [1]   Social History  Tobacco Use   Smoking Status Every Day    Current packs/day: 1.00    Types: Cigarettes   Smokeless " Tobacco Never

## 2025-04-26 LAB
BACTERIA BLD CULT: NORMAL
BACTERIA BLD CULT: NORMAL

## 2025-04-29 ENCOUNTER — OFFICE VISIT (OUTPATIENT)
Dept: SURGERY | Facility: CLINIC | Age: 35
End: 2025-04-29
Payer: MEDICAID

## 2025-04-29 DIAGNOSIS — Z90.49 S/P LAPAROSCOPIC APPENDECTOMY: ICD-10-CM

## 2025-04-29 DIAGNOSIS — Z98.890 POST-OPERATIVE STATE: ICD-10-CM

## 2025-04-29 DIAGNOSIS — D50.8 IRON DEFICIENCY ANEMIA SECONDARY TO INADEQUATE DIETARY IRON INTAKE: Primary | ICD-10-CM

## 2025-04-29 DIAGNOSIS — K35.30 ACUTE APPENDICITIS WITH LOCALIZED PERITONITIS, WITHOUT PERFORATION, ABSCESS, OR GANGRENE: Primary | ICD-10-CM

## 2025-04-29 PROCEDURE — 99024 POSTOP FOLLOW-UP VISIT: CPT | Mod: 95,,,

## 2025-04-29 NOTE — PROGRESS NOTES
Televisit Note     Date of surgery: April 13, 2025  Procedure: lap appy  Surgeon: Dr. Hugh Winn  Patient location: home  Chief Complaint: post op evaluation  Visit type: audiovisual     Subjective:  Thalia Ceballos is doing well. Has no complaints. Reports her incision sites have healed up. Reports some LLQ incision drainage, light pink serous fluid. States swelling has improved. Was recently seen in the ED for abdominal pain. Evaluated by Dr Dykes from General Surgery. CT without acute findings. Some burning pain with physical movement. Tolerating a diet. Having bowel movements. Denies fever, chills, nausea, vomiting, and changes in bowel movement. Pathology reviewed. Pathology report (see below) was discussed with the patient. Post-op care discussed. All questions were answered. Recommended to call our clinic as needed with any concerns.     Past medical history:  Past Medical History:   Diagnosis Date    Anemia     High cholesterol     Migraines     Mitral valve prolapse      Past Medical History:   Diagnosis Date    Anemia     High cholesterol     Migraines     Mitral valve prolapse      Past surgical history:  Past Surgical History:   Procedure Laterality Date    CHOLECYSTECTOMY      In 2011    GALLBLADDER SURGERY  2011    KIDNEY STONE SURGERY  2013    had stent put in to drain and then removed.    LAPAROSCOPIC APPENDECTOMY N/A 4/13/2025    Procedure: APPENDECTOMY, LAPAROSCOPIC;  Surgeon: Hugh Winn MD;  Location: [facility];  Service: General;  Laterality: N/A;    TUBAL LIGATION  12/2015    tubes tied  2015     Family history:  Family History   Problem Relation Name Age of Onset    Diabetes Mother Zabrina Stauffer     Diabetes Maternal Grandmother Jasmina      Social history:  Social History     Socioeconomic History    Marital status:    Tobacco Use    Smoking status: Every Day     Current packs/day: 1.00     Types: Cigarettes    Smokeless tobacco: Never   Substance and Sexual Activity     Alcohol use: Not Currently    Drug use: Never     Social Drivers of Health     Financial Resource Strain: Low Risk  (4/22/2025)    Overall Financial Resource Strain (CARDIA)     Difficulty of Paying Living Expenses: Not hard at all   Food Insecurity: No Food Insecurity (4/22/2025)    Hunger Vital Sign     Worried About Running Out of Food in the Last Year: Never true     Ran Out of Food in the Last Year: Never true   Transportation Needs: No Transportation Needs (4/22/2025)    PRAPARE - Transportation     Lack of Transportation (Medical): No     Lack of Transportation (Non-Medical): No   Physical Activity: Insufficiently Active (4/22/2025)    Exercise Vital Sign     Days of Exercise per Week: 2 days     Minutes of Exercise per Session: 20 min   Stress: No Stress Concern Present (4/22/2025)    [employer] [place] [employer] - Occupational Stress Questionnaire     Feeling of Stress : Not at all   Housing Stability: Low Risk  (4/22/2025)    Housing Stability Vital Sign     Unable to Pay for Housing in the Last Year: No     Number of Times Moved in the Last Year: 0     Homeless in the Last Year: No     Tobacco Use History[1]   Social History     Substance and Sexual Activity   Alcohol Use Not Currently      Allergies:   Review of patient's allergies indicates:   Allergen Reactions    Bentyl [dicyclomine] Swelling     Other reaction(s): facial swelling w/resp. distress  Other reaction(s): facial swelling w/resp. distress      Fluoxetine Swelling    Iodine Swelling    Taxol [paclitaxel]     Shellfish containing products Hives and Rash     Other reaction(s): swelling of hands and face     Home Meds:   Current Outpatient Medications   Medication Instructions    albuterol (PROVENTIL/VENTOLIN HFA) 90 mcg/actuation inhaler 1-2 puffs, Inhalation, Every 6 hours PRN, Rescue    divalproex ER (DEPAKOTE ER) 250 mg, Oral, Every 12 hours    fenofibrate 160 MG Tab 1 tablet, Daily    gabapentin (NEURONTIN) 300 mg    icosapent  ethyL (VASCEPA) 2,000 mg    nicotine (NICODERM CQ) 14 mg/24 hr 1 patch    omega-3 acid ethyl esters (LOVAZA) 1 g, Daily    rizatriptan (MAXALT-MLT) 10 mg, Oral, 2 times daily PRN, May repeat in 2 hours if needed    Medications Ordered Prior to Encounter[2]   Current Outpatient Medications on File Prior to Visit   Medication Sig    albuterol (PROVENTIL/VENTOLIN HFA) 90 mcg/actuation inhaler Inhale 1-2 puffs into the lungs every 6 (six) hours as needed for Wheezing (cough). Rescue    divalproex ER (DEPAKOTE ER) 250 MG 24 hr tablet Take 1 tablet (250 mg total) by mouth every 12 (twelve) hours.    fenofibrate 160 MG Tab Take 1 tablet by mouth once daily.    gabapentin (NEURONTIN) 300 MG capsule Take 300 mg by mouth.    icosapent ethyL (VASCEPA) 1 gram Cap Take 2,000 mg by mouth.    nicotine (NICODERM CQ) 14 mg/24 hr 1 patch.    omega-3 acid ethyl esters (LOVAZA) 1 gram capsule Take 1 g by mouth once daily.    rizatriptan (MAXALT-MLT) 10 MG disintegrating tablet Take 1 tablet (10 mg total) by mouth 2 (two) times daily as needed for Migraine. May repeat in 2 hours if needed     No current facility-administered medications on file prior to visit.      Outpatient Medications as of 4/29/2025   Medication Sig Dispense Refill    albuterol (PROVENTIL/VENTOLIN HFA) 90 mcg/actuation inhaler Inhale 1-2 puffs into the lungs every 6 (six) hours as needed for Wheezing (cough). Rescue 8 g 0    divalproex ER (DEPAKOTE ER) 250 MG 24 hr tablet Take 1 tablet (250 mg total) by mouth every 12 (twelve) hours. 60 tablet 3    fenofibrate 160 MG Tab Take 1 tablet by mouth once daily.      gabapentin (NEURONTIN) 300 MG capsule Take 300 mg by mouth.      icosapent ethyL (VASCEPA) 1 gram Cap Take 2,000 mg by mouth.      nicotine (NICODERM CQ) 14 mg/24 hr 1 patch.      omega-3 acid ethyl esters (LOVAZA) 1 gram capsule Take 1 g by mouth once daily.      rizatriptan (MAXALT-MLT) 10 MG disintegrating tablet Take 1 tablet (10 mg total) by mouth 2 (two)  times daily as needed for Migraine. May repeat in 2 hours if needed 9 tablet 4     No current facility-administered medications on file as of 4/29/2025.        Media from visit:            Pathology:  Specimen to Pathology  Order: [id number]   Status: Final result       Next appt: 05/01/2025 at 09:00 AM in Hematology and Oncology (Mike Novoa MD)       Dx: Acute appendicitis with localized per...    Test Result Released: Yes (seen)    0 Result Notes      Component  Ref Range & Units (hover) 2 wk ago   Pathology Result    Comment:          FINAL DIAGNOSIS     APPENDIX, APPENDECTOMY:     REACTIVE LYMPHOID HYPERPLASIA.  SEE COMMENT.     Electronically Signed by:  Lobito Cline M.D., P.H.D. , Pathologist  (Case signed 04/15/2025 at 09:58am)     Comment  No evidence of active cryptitis or mural neutrophilic infiltrate to suggest  acute appendicitis.     Source  APPENDIX     Clinical Information  SIRS  ACUTE RIGHT FLANK PAIN  ACUTE APPENDICITIS     Gross Description  Received in formalin labeled `Thalia Ceballos, appendix` and listed on the  requisition as `appendix` is a vermiform appendix that measures 7.8 cm in length  by 0.5 cm in diameter.  The serosa is tan-pink, glistening.  The proximal margin  is received stapled.  Sectioning reveals a patent lumen containing a soft brown  fecalith that measures 5.2 cm in greatest dimension.  The wall averages 0.1 cm  in thickness.  No masses or perforations are grossly identified.  Representative  sections are submitted as follows:  1A: Proximal margin and distal tip bisected  1B: Representative full-thickness sections  X-F-2     RJ 4/14/2025     Microscopic Description  1. Microscopic examination performed.  Please see diagnosis.         Resulting Agency [facility]             Specimen Collected: 04/13/25 14:03 CDT Last Resulted: 04/15/25 00:00 CDT       Visit Diagnoses:    ICD-10-CM ICD-9-CM   1. Acute appendicitis with localized peritonitis, without perforation, abscess, or  gangrene  K35.30 540.9   2. S/P laparoscopic appendectomy  Z90.49 V45.89   3. Post-operative state  Z98.890 V45.89       Plan:  - pain regimen with tylenol and ibuprofen   - ED precautions given  - Follow up PCP   - Return PRN, no scheduled appointment needed. Call for concerns.    Future follow ups:  Future Appointments   Date Time Provider Department Center   5/1/2025  9:00 AM Mike Novoa MD [facility]   6/30/2025  9:00 AM Sushma Mcfarlane MD [facility]        4/29/2025     The above findings, diagnostics, and treatment plan were discussed with Dr. Hugh Winn.  This note/OR report was created with the assistance of  voice recognition software or phone  dictation.  There may be transcription errors as a result of using this technology however minimal. Effort has been made to assure accuracy of transcription but any obvious errors or omissions should be clarified with the author of the document.    Face to Face time with patient: 10 minutes  10 minutes of total time spent on the encounter, which includes face to face time and non-face to face time preparing to see the patient (eg, review of tests), Obtaining and/or reviewing separately obtained history, Documenting clinical information in the electronic or other health record, Independently interpreting results (not separately reported) and communicating results to the patient/family/caregiver, or Care coordination (not separately reported).   Each patient to whom he or she provides medical services by telemedicine is:  (1) informed of the relationship between the physician and patient and the respective role of any other health care provider with respect to management of the patient; and (2) notified that he or she may decline to receive medical services by telemedicine and may withdraw from such care at any time.        [1]   Social History  Tobacco Use   Smoking Status Every Day    Current packs/day: 1.00    Types: Cigarettes   Smokeless  Tobacco Never   [2]   Current Outpatient Medications on File Prior to Visit   Medication Sig Dispense Refill    albuterol (PROVENTIL/VENTOLIN HFA) 90 mcg/actuation inhaler Inhale 1-2 puffs into the lungs every 6 (six) hours as needed for Wheezing (cough). Rescue 8 g 0    divalproex ER (DEPAKOTE ER) 250 MG 24 hr tablet Take 1 tablet (250 mg total) by mouth every 12 (twelve) hours. 60 tablet 3    fenofibrate 160 MG Tab Take 1 tablet by mouth once daily.      gabapentin (NEURONTIN) 300 MG capsule Take 300 mg by mouth.      icosapent ethyL (VASCEPA) 1 gram Cap Take 2,000 mg by mouth.      nicotine (NICODERM CQ) 14 mg/24 hr 1 patch.      omega-3 acid ethyl esters (LOVAZA) 1 gram capsule Take 1 g by mouth once daily.      rizatriptan (MAXALT-MLT) 10 MG disintegrating tablet Take 1 tablet (10 mg total) by mouth 2 (two) times daily as needed for Migraine. May repeat in 2 hours if needed 9 tablet 4     No current facility-administered medications on file prior to visit.

## 2025-04-30 ENCOUNTER — LAB VISIT (OUTPATIENT)
Dept: LAB | Facility: HOSPITAL | Age: 35
End: 2025-04-30
Attending: INTERNAL MEDICINE
Payer: MEDICAID

## 2025-04-30 DIAGNOSIS — D50.8 IRON DEFICIENCY ANEMIA SECONDARY TO INADEQUATE DIETARY IRON INTAKE: ICD-10-CM

## 2025-04-30 DIAGNOSIS — D50.8 IRON DEFICIENCY ANEMIA SECONDARY TO INADEQUATE DIETARY IRON INTAKE: Primary | ICD-10-CM

## 2025-04-30 LAB
BASOPHILS # BLD AUTO: 0.08 X10(3)/MCL
BASOPHILS NFR BLD AUTO: 0.8 %
EOSINOPHIL # BLD AUTO: 0.81 X10(3)/MCL (ref 0–0.9)
EOSINOPHIL NFR BLD AUTO: 7.8 %
ERYTHROCYTE [DISTWIDTH] IN BLOOD BY AUTOMATED COUNT: 12.4 % (ref 11.5–17)
FERRITIN SERPL-MCNC: 74.87 NG/ML (ref 4.63–204)
FOLATE SERPL-MCNC: 4.7 NG/ML (ref 7–31.4)
HCT VFR BLD AUTO: 44.2 % (ref 37–47)
HGB BLD-MCNC: 14.2 G/DL (ref 12–16)
IMM GRANULOCYTES # BLD AUTO: 0.02 X10(3)/MCL (ref 0–0.04)
IMM GRANULOCYTES NFR BLD AUTO: 0.2 %
IRON SATN MFR SERPL: 25 % (ref 20–50)
IRON SERPL-MCNC: 79 UG/DL (ref 50–170)
LYMPHOCYTES # BLD AUTO: 3.6 X10(3)/MCL (ref 0.6–4.6)
LYMPHOCYTES NFR BLD AUTO: 34.6 %
MCH RBC QN AUTO: 31.2 PG (ref 27–31)
MCHC RBC AUTO-ENTMCNC: 32.1 G/DL (ref 33–36)
MCV RBC AUTO: 97.1 FL (ref 80–94)
MONOCYTES # BLD AUTO: 0.72 X10(3)/MCL (ref 0.1–1.3)
MONOCYTES NFR BLD AUTO: 6.9 %
NEUTROPHILS # BLD AUTO: 5.16 X10(3)/MCL (ref 2.1–9.2)
NEUTROPHILS NFR BLD AUTO: 49.7 %
NRBC BLD AUTO-RTO: 0 %
PLATELET # BLD AUTO: 431 X10(3)/MCL (ref 130–400)
PMV BLD AUTO: 9.5 FL (ref 7.4–10.4)
RBC # BLD AUTO: 4.55 X10(6)/MCL (ref 4.2–5.4)
TIBC SERPL-MCNC: 236 UG/DL (ref 70–310)
TIBC SERPL-MCNC: 315 UG/DL (ref 250–450)
TRANSFERRIN SERPL-MCNC: 282 MG/DL (ref 180–382)
VIT B12 SERPL-MCNC: 741 PG/ML (ref 213–816)
WBC # BLD AUTO: 10.39 X10(3)/MCL (ref 4.5–11.5)

## 2025-04-30 PROCEDURE — 83550 IRON BINDING TEST: CPT

## 2025-04-30 PROCEDURE — 85025 COMPLETE CBC W/AUTO DIFF WBC: CPT

## 2025-04-30 PROCEDURE — 82746 ASSAY OF FOLIC ACID SERUM: CPT

## 2025-04-30 PROCEDURE — 82728 ASSAY OF FERRITIN: CPT

## 2025-04-30 PROCEDURE — 82607 VITAMIN B-12: CPT

## 2025-04-30 PROCEDURE — 36415 COLL VENOUS BLD VENIPUNCTURE: CPT

## 2025-05-01 ENCOUNTER — OFFICE VISIT (OUTPATIENT)
Dept: HEMATOLOGY/ONCOLOGY | Facility: CLINIC | Age: 35
End: 2025-05-01
Payer: MEDICAID

## 2025-05-01 VITALS
RESPIRATION RATE: 16 BRPM | BODY MASS INDEX: 31.31 KG/M2 | HEIGHT: 64 IN | OXYGEN SATURATION: 98 % | TEMPERATURE: 98 F | DIASTOLIC BLOOD PRESSURE: 70 MMHG | WEIGHT: 183.38 LBS | SYSTOLIC BLOOD PRESSURE: 103 MMHG | HEART RATE: 87 BPM

## 2025-05-01 DIAGNOSIS — D50.8 IRON DEFICIENCY ANEMIA SECONDARY TO INADEQUATE DIETARY IRON INTAKE: Primary | ICD-10-CM

## 2025-05-01 PROCEDURE — 3074F SYST BP LT 130 MM HG: CPT | Mod: CPTII,,, | Performed by: INTERNAL MEDICINE

## 2025-05-01 PROCEDURE — 3078F DIAST BP <80 MM HG: CPT | Mod: CPTII,,, | Performed by: INTERNAL MEDICINE

## 2025-05-01 PROCEDURE — 99214 OFFICE O/P EST MOD 30 MIN: CPT | Mod: ,,, | Performed by: INTERNAL MEDICINE

## 2025-05-01 PROCEDURE — 3008F BODY MASS INDEX DOCD: CPT | Mod: CPTII,,, | Performed by: INTERNAL MEDICINE

## 2025-05-01 PROCEDURE — 1159F MED LIST DOCD IN RCRD: CPT | Mod: CPTII,,, | Performed by: INTERNAL MEDICINE

## 2025-05-01 RX ORDER — CYCLOBENZAPRINE HCL 10 MG
10 TABLET ORAL
COMMUNITY
Start: 2025-04-02

## 2025-05-01 RX ORDER — DEXAMETHASONE 4 MG/1
TABLET ORAL
COMMUNITY
Start: 2025-04-03

## 2025-05-01 RX ORDER — NICOTINE 7MG/24HR
PATCH, TRANSDERMAL 24 HOURS TRANSDERMAL
COMMUNITY
Start: 2025-04-03

## 2025-05-01 RX ORDER — EZETIMIBE 10 MG
TABLET ORAL
COMMUNITY
Start: 2025-04-03

## 2025-05-01 NOTE — PROGRESS NOTES
Referring provider: Merline GRIJALVA      Reason for consultation: Macrocytosis      HPI:  34 Year old female with history of seasonal allergies dyslipidemia migraines here for evaluation of macrocytosis.  Patient has been dealing with numbness and tingling in her feet and back along with severe migraines and follows with Neurology.  She also sees a cardiologist for dyslipidemia and is not on any new medications.  Labs from May 21, 2023 show white blood cell count 24597 hemoglobin 15.2 MCV 94.4 platelets 363.  Review of labs dating as far back as 2000 19 of January show MCV at that time was 98.7.  Patient does not take any supplements at this time.  History reviewed. No pertinent past medical history.           Interval history:     5/1/25; Patient here for follow-up. She was doing well with FOLIC ACID DAILY and IRON MWF but was admitted to the hospital 4/13 for acute appendicitis and had appendectomy. She is recovering well with some pain around incision. She did have drainage a few days ago but has resolved. . Labs from April 29 show Iron 47 % sat 14 ferritin 15 folic acid 10.36 white blood cell count 7760 hemoglobin 13.6 MCV 87.4 platelets 239    01/29/2025: Patient continues on iron  supplementation and increased folic acid. She is having some abdominal pain and constipation.  Her labs show marked improvement in iron and now serum iron 75% sat 22 and ferritin of 78 with folic acid 7.17     Patient here for follow up today.  She had labs done on October 22nd that showed iron 57% sat 15 ferritin 63 folic acid 5.26 vitamin B12 740 white blood cell count 65649 hemoglobin 14.4 MCV 95 platelets 363 absolute neutrophil count 6160.  She is still having joint pains and lower back pain and starting physical therapy.  She is not able to have injections at this time due to her insurance and may need to change         Past Medical History:   Diagnosis Date    Anemia     High cholesterol     Migraines     Mitral valve  prolapse        Past Surgical History:   Procedure Laterality Date    CHOLECYSTECTOMY      In 2011    GALLBLADDER SURGERY  2011    KIDNEY STONE SURGERY  2013    had stent put in to drain and then removed.    LAPAROSCOPIC APPENDECTOMY N/A 4/13/2025    Procedure: APPENDECTOMY, LAPAROSCOPIC;  Surgeon: Hugh Winn MD;  Location: [facility];  Service: General;  Laterality: N/A;    TUBAL LIGATION  12/2015    tubes tied  2015       Family History   Problem Relation Name Age of Onset    Diabetes Mother Zabrina Stauffer     Diabetes Maternal Grandmother Jasmina        Social History     Socioeconomic History    Marital status:    Tobacco Use    Smoking status: Every Day     Current packs/day: 1.00     Types: Cigarettes    Smokeless tobacco: Never   Substance and Sexual Activity    Alcohol use: Not Currently    Drug use: Never     Social Drivers of Health     Financial Resource Strain: Low Risk  (4/22/2025)    Overall Financial Resource Strain (CARDIA)     Difficulty of Paying Living Expenses: Not hard at all   Food Insecurity: No Food Insecurity (4/22/2025)    Hunger Vital Sign     Worried About Running Out of Food in the Last Year: Never true     Ran Out of Food in the Last Year: Never true   Transportation Needs: No Transportation Needs (4/22/2025)    PRAPARE - Transportation     Lack of Transportation (Medical): No     Lack of Transportation (Non-Medical): No   Physical Activity: Insufficiently Active (4/22/2025)    Exercise Vital Sign     Days of Exercise per Week: 2 days     Minutes of Exercise per Session: 20 min   Stress: No Stress Concern Present (4/22/2025)    [employer] [employer] - Occupational Stress Questionnaire     Feeling of Stress : Not at all   Housing Stability: Low Risk  (4/22/2025)    Housing Stability Vital Sign     Unable to Pay for Housing in the Last Year: No     Number of Times Moved in the Last Year: 0     Homeless in the Last Year: No       Current Medications[1]    Review of  patient's allergies indicates:   Allergen Reactions    Bentyl [dicyclomine] Swelling     Other reaction(s): facial swelling w/resp. distress  Other reaction(s): facial swelling w/resp. distress      Fluoxetine Swelling    Iodine Swelling    Taxol [paclitaxel]     Shellfish containing products Hives and Rash     Other reaction(s): swelling of hands and face         Review of systems  CONSTITUTIONAL: no fevers, no chills, no weight loss, no fatigue, no weakness  HEMATOLOGIC: no abnormal bleeding, no abnormal bruising, no drenching night sweats  ONCOLOGIC: no new masses or lumps  HEENT: no vision loss, no tinnitus or hearing loss, no nose bleeding, no dysphagia, no odynophagia  CVS: no chest pain, no palpitations, no dyspnea on exertion  RESP: no shortness of breath, no hemoptysis, no cough  GI: no nausea, no vomiting, no diarrhea, no constipation, no melena, no hematochezia, no hematemesis, no abdominal pain, no increase in abdominal girth  : no dysuria, no hematuria, no hesitancy, no scrotal swelling, no discharge  INTEGUMENT: no rashes, no abnormal bruising, no nail pitting, no hyperpigmentation  NEURO: no falls, no memory loss, no paresthesias or dysesthesias, no urofecal incontinence or retention, no loss of strength on any extremity  MSK: no back pain, no new joint pain, no joint swelling  PSYCH: no suicidal or homicidal ideation, no depression, no insomnia, no anhedonia  ENDOCRINE: no heat or cold intolerance, no polyuria, no polydipsia      Physical Exam:  Vitals:    05/01/25 0942   BP: 103/70   Pulse: 87   Resp: 16   Temp: 98.3 °F (36.8 °C)       ECOG PS 0  GA: AAOx3, NAD  HEENT: NCAT, PERRLA, EOMI, good dentition, moist oral mucous membranes  LYMPH: no cervical, axillary or supraclavicular adenopathy  CVS: s1s2 RRR, no M/R/G  RESP: CTA b/l, no crackles, no wheezes or rhonchi  ABD: soft, NT, ND, BS+, no hepatosplenomegaly  EXT: no deformities, no pedal edema  SKIN: no rashes, warm and dry, redness around  laparoscopic site but no purulence noted   NEURO: normal mentation, strength 5/5 on all 4 extremities, no sensory deficits    LABS:     4/29/25  Iron 47 % sat 14 ferritin 15 folic acid 10.36 white blood cell count 7760 hemoglobin 13.6 MCV 87.4 platelets 239   01/27/2025   iron 75 % sat 22 ferritin 78 folic acid 7.17 B12 603 white blood cell count 18451 hemoglobin 14.2 MCV 93.1 platelets 302  10/22/24  SPEP negative iron 57% sat 15 ferritin 63 folic acid 5.26 vitamin B12 740 white blood cell count 74819 hemoglobin 14.4 MCV 95 platelets 363 absolute neutrophil count 6160.    RADIOLOGY:  None  PATHOLOGY  None  Assessment  1. Macrocytosis, with folic acid deficiency        Plan      05/01/2025: Patient's iron levels have decreased slightlybut could be post surgical.  therefore I would recommend she continue same dose of Iron on MWF.   She has no anemia or microcytosis at this time so we will continue on oral replacement only.  Continue folic acid as well.  repeat labs in 3 months.     01/29/2025: Patient has had marked improvement of iron levels with increased oral iron intake without infusion.  I recommend she continue the same plan of action and continue her iron pills but decrease to MWF.  Microcytosis has resolved.  We will follow her labs every 3 months moving forward        Reviewed patient's labs in detail and show mild low ferritin.  She was started using an iron skillet and this should help.  I have also asked her to increase her iron intake with foods as she does not need to supplement she does not have anemia.  Her folic acid levels were moderately low and I have asked her to start a folic acid 1 mg per day supplement.  We will recheck her labs in 10 weeks.  The macrocytosis is not contributing to her joint pains and we will follow.  Thank you for allowing me to care for this patient        A total of  30 minutes were spent in review of records, interpretation of test, coordination of care, discussion and  counseling with the patient.        Portions of the record may have been created with voice recognition software. Occasional wrong-word or sound-a-like substitutions may have occurred due to the inherent limitations of voice recognition software. Read the chart carefully and recognize, using context, where substitutions have occurred.           [1]   Current Outpatient Medications   Medication Sig Dispense Refill    albuterol (PROVENTIL/VENTOLIN HFA) 90 mcg/actuation inhaler Inhale 1-2 puffs into the lungs every 6 (six) hours as needed for Wheezing (cough). Rescue 8 g 0    divalproex ER (DEPAKOTE ER) 250 MG 24 hr tablet Take 1 tablet (250 mg total) by mouth every 12 (twelve) hours. 60 tablet 3    fenofibrate 160 MG Tab Take 1 tablet by mouth once daily.      gabapentin (NEURONTIN) 300 MG capsule Take 300 mg by mouth.      icosapent ethyL (VASCEPA) 1 gram Cap Take 2,000 mg by mouth.      nicotine (NICODERM CQ) 14 mg/24 hr 1 patch.      omega-3 acid ethyl esters (LOVAZA) 1 gram capsule Take 1 g by mouth once daily.      rizatriptan (MAXALT-MLT) 10 MG disintegrating tablet Take 1 tablet (10 mg total) by mouth 2 (two) times daily as needed for Migraine. May repeat in 2 hours if needed 9 tablet 4     No current facility-administered medications for this visit.

## 2025-07-23 ENCOUNTER — TELEPHONE (OUTPATIENT)
Dept: HEMATOLOGY/ONCOLOGY | Facility: CLINIC | Age: 35
End: 2025-07-23
Payer: MEDICAID

## 2025-07-23 NOTE — TELEPHONE ENCOUNTER
Called patient about labs and to confirm appointment for 7/24/25. Spoke with patient. Patient requested to reschedule appointment because labs were not done due to her kids having back to back doctor appointments. Informed patient I will send a message to the  who will call her tomorrow with a new appointment date and time. Patient verbalized understanding.

## 2025-07-25 NOTE — PROGRESS NOTES
[facility] Follow Up Telemedicine Visit Note    Initial Visit Date: 3/21/2025  Last Visit Date: 3/21/2025  Current Visit Date:  07/28/2025    Chief Complaint:     Chief Complaint   Patient presents with    Migraine     Pt reports no change, having to take tylenol frequently.       History of Present Illness:      This is a real-time audio/video visit that was performed with the originating site at patient's home and the distant site, [facility]. Verbal consent to participate in interactive audio & video visit was obtained.    I discussed with the patient regarding the nature of our telehealth visits, that:    - Our sessions are not being recorded and that personal health information is protected  - Provider would evaluate the patient and recommend diagnostics and treatments based on my assessment  - [facility] will provide follow up care in person if/when the patient needs it.       This is 35 y.o. female with history of depression who is referred for chronic migraine with visual cortical aura and medication overuse headache, exacerbated by life stressors. During last visit, tylenol and sumatriptan was discontinued. Decadron 4 mg twice a day for 7 days, Depakote 250 mg twice a day, and Rizatriptan 10 mg twice a day as needed was started. Since last visit, patient had acute appendicitis in 4/2025. She states that her appetite has not been as good since then. She has also been trying to modify her diet. She now complains of dizziness and lightheadedness upon standing.     Age of Onset : 12    Headache Description: left temporal, pounding, moderate, not impeding day to day activity, with nausea, with photophobia, impeding day to day activity. No OU visual aura.     Frequency: daily pounding headaches for the past 10 years, since she gave birth to her daughter.     Provocation Factors: mother passing away around 10 years ago from an overdose;  taking care of a daughter with congenital heart disease and  with cardiac disease. No homicidal or suicidal ideation.      Risk Factors  - Family history of headache disorder: mom and dad with headache disorder  - History of focal CNS lesions: No  - History of CNS infections: No  - History head trauma: Yes Had screw  penetrating scalp when she was a child  - History of underlying mood disorder: Yes anxiety disorder. Socially isolated. taking care of a daughter with congenital heart disease and  with cardiac disease.Previously had alcohol dependence due to ongoing depression and anxiety.   - History of sleep disorder: Yes insomnia, difficulty getting to sleep  - Recreational drug use: Yes history of alcohol dependence due to ongoing depression and anxiety.  - Tobacco use: No  - Alcohol use: Yes has history of alcohol abuse, sober for 3 years  - Weight fluctuation: Unknown  - Isotretinoin or Tetracycline use:  No  - Family planning and contraceptive use: tubal ligation    Medications:     Current Prophylactic  Gabapentin 300 mg at night  Depakote 250 mg twice a day (3/21/2025 to present)     Current Abortive  Rizatriptan 10 mg twice a day as needed (3/21/2025 to present)    Prior Prophylactic  Amitriptyline 100 mg at bedtime (1/2025 to 5/2/2025): stopped taking it in 4/2025.     Prior Abortive  Sumatriptan 100 mg twice a day as needed: ineffective  Tylenol PRN: daily     Devices:     - VNS:  - TNS  - TMS:     Procedures:     - Botox:  - PSG block:   - Occipital nerve block:     Labs:     Results for orders placed or performed in visit on 04/30/25   B-12    Collection Time: 04/30/25 10:17 AM   Result Value Ref Range    Vitamin B12 741 213 - 816 pg/mL   FOLATE    Collection Time: 04/30/25 10:17 AM   Result Value Ref Range    Folate Level 4.7 (L) 7.0 - 31.4 ng/mL   Iron and TIBC    Collection Time: 04/30/25 10:17 AM   Result Value Ref Range    Iron Binding Capacity Unsaturated 236 70 - 310 ug/dL     Iron Level 79 50 - 170 ug/dL    Transferrin 282 180 - 382 mg/dL    Iron Binding Capacity Total 315 250 - 450 ug/dL    Iron Saturation 25 20 - 50 %   FERRITIN    Collection Time: 04/30/25 10:17 AM   Result Value Ref Range    Ferritin Level 74.87 4.63 - 204.00 ng/mL   CBC with Differential    Collection Time: 04/30/25 10:17 AM   Result Value Ref Range    WBC 10.39 4.50 - 11.50 x10(3)/mcL    RBC 4.55 4.20 - 5.40 x10(6)/mcL    Hgb 14.2 12.0 - 16.0 g/dL    Hct 44.2 37.0 - 47.0 %    MCV 97.1 (H) 80.0 - 94.0 fL    MCH 31.2 (H) 27.0 - 31.0 pg    MCHC 32.1 (L) 33.0 - 36.0 g/dL    RDW 12.4 11.5 - 17.0 %    Platelet 431 (H) 130 - 400 x10(3)/mcL    MPV 9.5 7.4 - 10.4 fL    Neut % 49.7 %    Lymph % 34.6 %    Mono % 6.9 %    Eos % 7.8 %    Basophil % 0.8 %    Imm Grans % 0.2 %    Neut # 5.16 2.1 - 9.2 x10(3)/mcL    Lymph # 3.60 0.6 - 4.6 x10(3)/mcL    Mono # 0.72 0.1 - 1.3 x10(3)/mcL    Eos # 0.81 0 - 0.9 x10(3)/mcL    Baso # 0.08 <=0.2 x10(3)/mcL    Imm Gran # 0.02 0.00 - 0.04 x10(3)/mcL    NRBC% 0.0 %       Studies:     - MRI Brain:   - MRA Head w/o Everton:   - MRV Head w/o Everton:   - NCHCT:  - Lumbar Puncture:    Review of Systems:     Review of Systems   All other systems reviewed and are negative.      Physical Exams:       Physical Exam  Nursing note reviewed.   Constitutional:       Appearance: Normal appearance.   HENT:      Head: Atraumatic.   Pulmonary:      Effort: Pulmonary effort is normal.   Musculoskeletal:         General: Normal range of motion.      Cervical back: Normal range of motion.   Neurological:      Mental Status: She is alert.       Telemedicine Comprehensive Neurological Exam:  Mental Status: Alert Oriented to Self, Date, and Place.  Comprehension wnl. No dysarthria.   CN II - XII: AGUEDA, VA grossly intact, No ptosis OU, EOMI without nystagmus, Hearing grossly intact, Face Symmetric, Tongue and Uvula midline, Trapezius symmetric bilateral.   Motor: no abnormal involuntary or voluntary movements, Fine finger  movements wnl b/l, No pronator drift.   Sensory: unable to assess.  Reflexes: unable to assess.   Cerebellar: RAHM wnl b/l.  Romberg: absent.   Gait: normal.    Assessment:     This is 35 y.o. female with history of  depression who is referred for chronic migraine with visual cortical aura and medication overuse headache, exacerbated by life stressors. Headache intensity improved with Depakote. Now experiencing orthostatic dizziness. Patient currently opting not to adjust medications.     1. Chronic migraine with aura without status migrainosus, not intractable  -     divalproex ER (DEPAKOTE ER) 250 MG 24 hr tablet; Take 1 tablet (250 mg total) by mouth every 12 (twelve) hours.  Dispense: 180 tablet; Refill: 1  -     rizatriptan (MAXALT-MLT) 10 MG disintegrating tablet; Take 1 tablet (10 mg total) by mouth 2 (two) times daily as needed for Migraine. May repeat in 2 hours if needed  Dispense: 9 tablet; Refill: 4    2. Orthostatic dizziness    3. Iron deficiency anemia secondary to inadequate dietary iron intake          Plan:     [] continue with Rizatriptan 10 mg twice a day as needed   [] continue with Depakote 250 mg twice a day   [] highly advised patient to follow up with Hematologist regarding anemia as this may contribution to orthostatic dizziness.     RTC 3 months Telemedicine    Headache education provided: good sleep hygiene and 7 hours of sleep per night, stress management, medication overuse education provided. Using more 3 OTC per week may worsen headaches, high intensity interval training has shown to reduce headache frequency. Low carb, high protein has shown to reduce headache frequency. Patient is instructed in keep headache diary.     I have explained the treatment plan, diagnosis, and prognosis to patient. All questions are answered to the best of my knowledge.     Visit today is associated with current or anticipated ongoing medical care related to this patient's single serious condition/complex  condition as documented above.     Face to face time 30 minutes, including documentation, chart review, counseling, education, review of test results, relevant medical records, and coordination of care.     Sushma Mcfarlane  General Neurology  07/28/2025

## 2025-07-28 ENCOUNTER — OFFICE VISIT (OUTPATIENT)
Dept: NEUROLOGY | Facility: CLINIC | Age: 35
End: 2025-07-28
Payer: MEDICAID

## 2025-07-28 DIAGNOSIS — G43.E09 CHRONIC MIGRAINE WITH AURA WITHOUT STATUS MIGRAINOSUS, NOT INTRACTABLE: Primary | ICD-10-CM

## 2025-07-28 DIAGNOSIS — R42 ORTHOSTATIC DIZZINESS: ICD-10-CM

## 2025-07-28 DIAGNOSIS — D50.8 IRON DEFICIENCY ANEMIA SECONDARY TO INADEQUATE DIETARY IRON INTAKE: ICD-10-CM

## 2025-07-28 RX ORDER — LANOLIN ALCOHOL/MO/W.PET/CERES
1 CREAM (GRAM) TOPICAL
COMMUNITY

## 2025-07-28 RX ORDER — RIZATRIPTAN BENZOATE 10 MG/1
10 TABLET, ORALLY DISINTEGRATING ORAL 2 TIMES DAILY PRN
Qty: 9 TABLET | Refills: 4 | Status: SHIPPED | OUTPATIENT
Start: 2025-07-28 | End: 2025-11-25

## 2025-07-28 RX ORDER — DIVALPROEX SODIUM 250 MG/1
250 TABLET, FILM COATED, EXTENDED RELEASE ORAL EVERY 12 HOURS
Qty: 180 TABLET | Refills: 1 | Status: SHIPPED | OUTPATIENT
Start: 2025-07-28 | End: 2026-01-24

## 2025-08-05 ENCOUNTER — LAB VISIT (OUTPATIENT)
Dept: LAB | Facility: HOSPITAL | Age: 35
End: 2025-08-05
Attending: INTERNAL MEDICINE
Payer: MEDICAID

## 2025-08-05 DIAGNOSIS — D50.8 IRON DEFICIENCY ANEMIA SECONDARY TO INADEQUATE DIETARY IRON INTAKE: ICD-10-CM

## 2025-08-05 LAB
BASOPHILS # BLD AUTO: 0.08 X10(3)/MCL
BASOPHILS NFR BLD AUTO: 0.8 %
EOSINOPHIL # BLD AUTO: 0.95 X10(3)/MCL (ref 0–0.9)
EOSINOPHIL NFR BLD AUTO: 9.6 %
ERYTHROCYTE [DISTWIDTH] IN BLOOD BY AUTOMATED COUNT: 13.2 % (ref 11.5–17)
FERRITIN SERPL-MCNC: 70.95 NG/ML (ref 4.63–204)
FOLATE SERPL-MCNC: 4.5 NG/ML (ref 7–31.4)
HCT VFR BLD AUTO: 43.7 % (ref 37–47)
HGB BLD-MCNC: 14.2 G/DL (ref 12–16)
IMM GRANULOCYTES # BLD AUTO: 0.03 X10(3)/MCL (ref 0–0.04)
IMM GRANULOCYTES NFR BLD AUTO: 0.3 %
IRON SATN MFR SERPL: 20 % (ref 20–50)
IRON SERPL-MCNC: 55 UG/DL (ref 50–170)
LYMPHOCYTES # BLD AUTO: 3.84 X10(3)/MCL (ref 0.6–4.6)
LYMPHOCYTES NFR BLD AUTO: 38.9 %
MCH RBC QN AUTO: 31.5 PG (ref 27–31)
MCHC RBC AUTO-ENTMCNC: 32.5 G/DL (ref 33–36)
MCV RBC AUTO: 96.9 FL (ref 80–94)
MONOCYTES # BLD AUTO: 0.67 X10(3)/MCL (ref 0.1–1.3)
MONOCYTES NFR BLD AUTO: 6.8 %
NEUTROPHILS # BLD AUTO: 4.3 X10(3)/MCL (ref 2.1–9.2)
NEUTROPHILS NFR BLD AUTO: 43.6 %
NRBC BLD AUTO-RTO: 0 %
PLATELET # BLD AUTO: 371 X10(3)/MCL (ref 130–400)
PMV BLD AUTO: 9.2 FL (ref 7.4–10.4)
RBC # BLD AUTO: 4.51 X10(6)/MCL (ref 4.2–5.4)
TIBC SERPL-MCNC: 220 UG/DL (ref 70–310)
TIBC SERPL-MCNC: 275 UG/DL (ref 250–450)
TRANSFERRIN SERPL-MCNC: 249 MG/DL (ref 180–382)
VIT B12 SERPL-MCNC: 684 PG/ML (ref 213–816)
WBC # BLD AUTO: 9.87 X10(3)/MCL (ref 4.5–11.5)

## 2025-08-05 PROCEDURE — 36415 COLL VENOUS BLD VENIPUNCTURE: CPT

## 2025-08-05 PROCEDURE — 82746 ASSAY OF FOLIC ACID SERUM: CPT

## 2025-08-05 PROCEDURE — 82607 VITAMIN B-12: CPT

## 2025-08-05 PROCEDURE — 85025 COMPLETE CBC W/AUTO DIFF WBC: CPT

## 2025-08-05 PROCEDURE — 83550 IRON BINDING TEST: CPT

## 2025-08-05 PROCEDURE — 82728 ASSAY OF FERRITIN: CPT

## 2025-08-06 ENCOUNTER — TELEPHONE (OUTPATIENT)
Dept: HEMATOLOGY/ONCOLOGY | Facility: CLINIC | Age: 35
End: 2025-08-06
Payer: MEDICAID

## 2025-08-06 NOTE — TELEPHONE ENCOUNTER
Called patient to confirm appointment for 8/8/25. Spoke with patient. Patient confirmed appointment.

## 2025-08-06 NOTE — PROGRESS NOTES
Referring provider: Merline GRIJALVA      Reason for consultation: Macrocytosis      HPI:  34 Year old female with history of seasonal allergies dyslipidemia migraines here for evaluation of macrocytosis.  Patient has been dealing with numbness and tingling in her feet and back along with severe migraines and follows with Neurology.  She also sees a cardiologist for dyslipidemia and is not on any new medications.  Labs from May 21, 2023 show white blood cell count 16999 hemoglobin 15.2 MCV 94.4 platelets 363.  Review of labs dating as far back as 2000 19 of January show MCV at that time was 98.7.  Patient does not take any supplements at this time.  History reviewed. No pertinent past medical history.           Interval history:  8/8/25 : Patient here for follow up.  Her labs from August 5th show white blood cell count 9870 hemoglobin 14.2 MCV 96.9 platelets 371 iron 55 % sat 20 B12 684 folic acid 4.5 B12 684.  She states that she is taking folic acid but not regularly.  She has also taking less iron      5/1/25; Patient here for follow-up. She was doing well with FOLIC ACID DAILY and IRON MWF but was admitted to the hospital 4/13 for acute appendicitis and had appendectomy. She is recovering well with some pain around incision. She did have drainage a few days ago but has resolved. . Labs from April 29 show Iron 47 % sat 14 ferritin 15 folic acid 10.36 white blood cell count 7760 hemoglobin 13.6 MCV 87.4 platelets 239     01/29/2025: Patient continues on iron  supplementation and increased folic acid. She is having some abdominal pain and constipation.  Her labs show marked improvement in iron and now serum iron 75% sat 22 and ferritin of 78 with folic acid 7.17     Patient here for follow up today.  She had labs done on October 22nd that showed iron 57% sat 15 ferritin 63 folic acid 5.26 vitamin B12 740 white blood cell count 82760 hemoglobin 14.4 MCV 95 platelets 363 absolute neutrophil count 6160.  She is still  having joint pains and lower back pain and starting physical therapy.  She is not able to have injections at this time due to her insurance and may need to change      Past Medical History:   Diagnosis Date    Anemia     High cholesterol     Migraines     Mitral valve prolapse        Past Surgical History:   Procedure Laterality Date    APPENDECTOMY      CHOLECYSTECTOMY      In 2011    GALLBLADDER SURGERY  2011    KIDNEY STONE SURGERY  2013    had stent put in to drain and then removed.    LAPAROSCOPIC APPENDECTOMY N/A 04/13/2025    Procedure: APPENDECTOMY, LAPAROSCOPIC;  Surgeon: Hugh Winn MD;  Location: [facility];  Service: General;  Laterality: N/A;    TUBAL LIGATION  12/2015    tubes tied  2015       Family History   Problem Relation Name Age of Onset    Diabetes Mother Zabrina Stauffer     Diabetes Maternal Grandmother Jasmina        Social History     Socioeconomic History    Marital status:    Tobacco Use    Smoking status: Every Day     Current packs/day: 1.00     Types: Cigarettes    Smokeless tobacco: Never   Substance and Sexual Activity    Alcohol use: Not Currently    Drug use: Never     Social Drivers of Health     Financial Resource Strain: Low Risk  (4/22/2025)    Overall Financial Resource Strain (CARDIA)     Difficulty of Paying Living Expenses: Not hard at all   Food Insecurity: No Food Insecurity (4/22/2025)    Hunger Vital Sign     Worried About Running Out of Food in the Last Year: Never true     Ran Out of Food in the Last Year: Never true   Transportation Needs: No Transportation Needs (4/22/2025)    PRAPARE - Transportation     Lack of Transportation (Medical): No     Lack of Transportation (Non-Medical): No   Physical Activity: Insufficiently Active (4/22/2025)    Exercise Vital Sign     Days of Exercise per Week: 2 days     Minutes of Exercise per Session: 20 min   Stress: No Stress Concern Present (4/22/2025)    [employer] - Occupational Stress  Questionnaire     Feeling of Stress : Not at all   Housing Stability: Low Risk  (2025)    Housing Stability Vital Sign     Unable to Pay for Housing in the Last Year: No     Number of Times Moved in the Last Year: 0     Homeless in the Last Year: No       [Current Medications]    [Current Medications]  Current Outpatient Medications   Medication Sig Dispense Refill    albuterol (PROVENTIL/VENTOLIN HFA) 90 mcg/actuation inhaler Inhale 1-2 puffs into the lungs every 6 (six) hours as needed for Wheezing (cough). Rescue 8 g 0    cyclobenzaprine (FLEXERIL) 10 MG tablet Take 10 mg by mouth.      dexAMETHasone (DECADRON) 4 MG Tab dexAMETHasone 4 mg tablet, [RxNorm: 251784]      divalproex ER (DEPAKOTE ER) 250 MG 24 hr tablet Take 1 tablet (250 mg total) by mouth every 12 (twelve) hours. 60 tablet 3    fenofibrate 160 MG Tab Take 1 tablet by mouth once daily.      gabapentin (NEURONTIN) 300 MG capsule Take 300 mg by mouth.      icosapent ethyL (VASCEPA) 1 gram Cap Take 2,000 mg by mouth.      nicotine (NICODERM CQ) 14 mg/24 hr 1 patch. (Patient not taking: Reported on 2025)      nicotine (NICODERM CQ) 7 mg/24 hr SMARTSI Daily      omega-3 acid ethyl esters (LOVAZA) 1 gram capsule Take 1 g by mouth once daily.      rizatriptan (MAXALT-MLT) 10 MG disintegrating tablet Take 1 tablet (10 mg total) by mouth 2 (two) times daily as needed for Migraine. May repeat in 2 hours if needed 9 tablet 4    ZETIA 10 mg tablet Zetia 10 mg tablet, [RxNorm: 926870]       No current facility-administered medications for this visit.       Review of patient's allergies indicates:   Allergen Reactions    Bentyl [dicyclomine] Swelling     Other reaction(s): facial swelling w/resp. distress  Other reaction(s): facial swelling w/resp. distress      Fluoxetine Swelling    Iodine Swelling    Taxol [paclitaxel]     Shellfish containing products Hives and Rash     Other reaction(s): swelling of hands and face         Review of  systems  CONSTITUTIONAL: no fevers, no chills, no weight loss, no fatigue, no weakness  HEMATOLOGIC: no abnormal bleeding, no abnormal bruising, no drenching night sweats  ONCOLOGIC: no new masses or lumps  HEENT: no vision loss, no tinnitus or hearing loss, no nose bleeding, no dysphagia, no odynophagia  CVS: no chest pain, no palpitations, no dyspnea on exertion  RESP: no shortness of breath, no hemoptysis, no cough  GI: no nausea, no vomiting, no diarrhea, no constipation, no melena, no hematochezia, no hematemesis, no abdominal pain, no increase in abdominal girth  : no dysuria, no hematuria, no hesitancy, no scrotal swelling, no discharge  INTEGUMENT: no rashes, no abnormal bruising, no nail pitting, no hyperpigmentation  NEURO: no falls, no memory loss, no paresthesias or dysesthesias, no urofecal incontinence or retention, no loss of strength on any extremity  MSK: no back pain, no new joint pain, no joint swelling  PSYCH: no suicidal or homicidal ideation, no depression, no insomnia, no anhedonia  ENDOCRINE: no heat or cold intolerance, no polyuria, no polydipsia      Physical Exam:  BP:    ()   Pulse:     Resp:     O2 Sat:     Temp:     POCT Glucose:     POCT UPT:     NPO solids:   @     NPO liquids:   @    Height:       Weight:  Contrast Outlier:    244.2mLs         ECOG PS 0  GA: AAOx3, NAD  HEENT: NCAT, PERRLA, EOMI, good dentition, moist oral mucous membranes  LYMPH: no cervical, axillary or supraclavicular adenopathy  CVS: s1s2 RRR, no M/R/G  RESP: CTA b/l, no crackles, no wheezes or rhonchi  ABD: soft, NT, ND, BS+, no hepatosplenomegaly  EXT: no deformities, no pedal edema  SKIN: no rashes, warm and dry  NEURO: normal mentation, strength 5/5 on all 4 extremities, no sensory deficits    LABS:   8/5/25  white blood cell count 9870 hemoglobin 14.2 MCV 96.9 platelets 371 iron 55 % sat 20 B12 684 folic acid 4.5 B12 684  4/29/25  Iron 47 % sat 14 ferritin 15 folic acid 10.36 white blood cell count 7760  hemoglobin 13.6 MCV 87.4 platelets 239   01/27/2025   iron 75 % sat 22 ferritin 78 folic acid 7.17 B12 603 white blood cell count 30428 hemoglobin 14.2 MCV 93.1 platelets 302  10/22/24  SPEP negative iron 57% sat 15 ferritin 63 folic acid 5.26 vitamin B12 740 white blood cell count 69207 hemoglobin 14.4 MCV 95 platelets 363 absolute neutrophil count 6160.    RADIOLOGY:  None  PATHOLOGY  None  Assessment  1. Macrocytosis, with folic acid deficiency        Plan    08/08/2025  Patient's iron levels remain stable.  Folic acid has decreased and would recommend patient stay compliance  Repeat labs in 12 weeks including iron panel and CBC and folic acid        05/01/2025: Patient's iron levels have decreased slightlybut could be post surgical.  therefore I would recommend she continue same dose of Iron on MWF.   She has no anemia or microcytosis at this time so we will continue on oral replacement only.  Continue folic acid as well.  repeat labs in 3 months.      01/29/2025: Patient has had marked improvement of iron levels with increased oral iron intake without infusion.  I recommend she continue the same plan of action and continue her iron pills but decrease to MWF.  Microcytosis has resolved.  We will follow her labs every 3 months moving forward      A total of  30 minutes were spent in review of records, interpretation of test, coordination of care, discussion and counseling with the patient.        Portions of the record may have been created with voice recognition software. Occasional wrong-word or sound-a-like substitutions may have occurred due to the inherent limitations of voice recognition software. Read the chart carefully and recognize, using context, where substitutions have occurred.

## 2025-08-08 ENCOUNTER — OFFICE VISIT (OUTPATIENT)
Dept: HEMATOLOGY/ONCOLOGY | Facility: CLINIC | Age: 35
End: 2025-08-08
Payer: MEDICAID

## 2025-08-08 VITALS
HEIGHT: 64 IN | TEMPERATURE: 99 F | RESPIRATION RATE: 16 BRPM | OXYGEN SATURATION: 99 % | HEART RATE: 91 BPM | DIASTOLIC BLOOD PRESSURE: 77 MMHG | SYSTOLIC BLOOD PRESSURE: 112 MMHG | WEIGHT: 185.13 LBS | BODY MASS INDEX: 31.6 KG/M2

## 2025-08-08 DIAGNOSIS — D75.89 MACROCYTOSIS WITHOUT ANEMIA: ICD-10-CM

## 2025-08-08 DIAGNOSIS — D50.8 IRON DEFICIENCY ANEMIA SECONDARY TO INADEQUATE DIETARY IRON INTAKE: Primary | ICD-10-CM

## (undated) DEVICE — BAG SPEC RETRV ENDO 4X6IN DISP

## (undated) DEVICE — ADHESIVE DERMABOND ADVANCED

## (undated) DEVICE — WARMER DRAPE STERILE LF

## (undated) DEVICE — CANNULA ENDOPATH XCEL 5X100MM

## (undated) DEVICE — BLADE SURG STAINLESS STEEL #11

## (undated) DEVICE — IRRIGATOR SUCTION W/TIP

## (undated) DEVICE — STAPLER ECHELON STAND 45X340MM

## (undated) DEVICE — APPLICATOR STRL COT 2INNR 6IN

## (undated) DEVICE — SUT SILK 3-0 BLK BR SH 30IN

## (undated) DEVICE — TRAY CATH 1-LYR URIMTR 16FR

## (undated) DEVICE — NDL HYPO 22GX1 1/2 SYR 10ML LL

## (undated) DEVICE — KIT SURGICAL TURNOVER

## (undated) DEVICE — RELOAD ECHELON ENDOPATH 45MM

## (undated) DEVICE — SOL IRRI STRL WATER 1000ML

## (undated) DEVICE — CHLORAPREP W TINT 26ML APPL

## (undated) DEVICE — TROCAR ENDOPATH XCEL 5X100MM

## (undated) DEVICE — GLOVE PROTEXIS BLUE LATEX 7.5

## (undated) DEVICE — TROCAR LAPSCP XCEL 12MM 10CM

## (undated) DEVICE — KIT GEN LAPAROSCOPY LAFAYETTE

## (undated) DEVICE — CORD LAP 10 DISP

## (undated) DEVICE — GLOVE PROTEXIS LTX MICRO  7.5

## (undated) DEVICE — SUT VICRYL+ 27 UR-6 VIOL

## (undated) DEVICE — SUT MCRYL PLUS 4-0 PS2 27IN

## (undated) DEVICE — NDL INSUF ULTRA VERESS 120MM

## (undated) DEVICE — SCISSOR CURVED ENDOPATH 5MM

## (undated) DEVICE — ELECTRODE PATIENT RETURN DISP